# Patient Record
Sex: FEMALE | Race: WHITE | Employment: FULL TIME | URBAN - METROPOLITAN AREA
[De-identification: names, ages, dates, MRNs, and addresses within clinical notes are randomized per-mention and may not be internally consistent; named-entity substitution may affect disease eponyms.]

---

## 2017-12-29 ENCOUNTER — APPOINTMENT (OUTPATIENT)
Dept: GENERAL RADIOLOGY | Age: 47
End: 2017-12-29
Attending: PHYSICIAN ASSISTANT
Payer: COMMERCIAL

## 2017-12-29 ENCOUNTER — HOSPITAL ENCOUNTER (EMERGENCY)
Age: 47
Discharge: HOME | End: 2017-12-30
Attending: EMERGENCY MEDICINE
Payer: COMMERCIAL

## 2017-12-29 VITALS
OXYGEN SATURATION: 99 % | TEMPERATURE: 98 F | HEART RATE: 99 BPM | BODY MASS INDEX: 27.64 KG/M2 | RESPIRATION RATE: 18 BRPM | SYSTOLIC BLOOD PRESSURE: 136 MMHG | HEIGHT: 66 IN | WEIGHT: 171.96 LBS | DIASTOLIC BLOOD PRESSURE: 64 MMHG

## 2017-12-29 DIAGNOSIS — S52.571A OTHER CLOSED INTRA-ARTICULAR FRACTURE OF DISTAL END OF RIGHT RADIUS, INITIAL ENCOUNTER: Primary | ICD-10-CM

## 2017-12-29 PROCEDURE — 73100 X-RAY EXAM OF WRIST: CPT

## 2017-12-29 PROCEDURE — 99283 EMERGENCY DEPT VISIT LOW MDM: CPT

## 2017-12-29 PROCEDURE — 75810000301 HC ER LEVEL 1 CLOSED TREATMNT FRACTURE/DISLOCATION

## 2017-12-29 PROCEDURE — 73110 X-RAY EXAM OF WRIST: CPT

## 2017-12-29 PROCEDURE — 74011250637 HC RX REV CODE- 250/637: Performed by: PHYSICIAN ASSISTANT

## 2017-12-29 RX ORDER — LIDOCAINE HYDROCHLORIDE 10 MG/ML
10 INJECTION, SOLUTION EPIDURAL; INFILTRATION; INTRACAUDAL; PERINEURAL ONCE
Status: DISCONTINUED | OUTPATIENT
Start: 2017-12-29 | End: 2017-12-30 | Stop reason: HOSPADM

## 2017-12-29 RX ORDER — OXYCODONE AND ACETAMINOPHEN 5; 325 MG/1; MG/1
1 TABLET ORAL
Status: COMPLETED | OUTPATIENT
Start: 2017-12-29 | End: 2017-12-29

## 2017-12-29 RX ADMIN — OXYCODONE HYDROCHLORIDE AND ACETAMINOPHEN 1 TABLET: 5; 325 TABLET ORAL at 21:51

## 2017-12-30 PROCEDURE — A4565 SLINGS: HCPCS

## 2017-12-30 RX ORDER — NAPROXEN 500 MG/1
500 TABLET ORAL
Qty: 20 TAB | Refills: 0 | Status: SHIPPED | OUTPATIENT
Start: 2017-12-30

## 2017-12-30 RX ORDER — OXYCODONE AND ACETAMINOPHEN 5; 325 MG/1; MG/1
.5-1 TABLET ORAL
Qty: 16 TAB | Refills: 0 | Status: SHIPPED | OUTPATIENT
Start: 2017-12-30 | End: 2020-09-30

## 2017-12-30 NOTE — ED PROVIDER NOTES
EMERGENCY DEPARTMENT HISTORY AND PHYSICAL EXAM      Date: 2017  Patient Name: Leonid Mckee    History of Presenting Illness     Chief Complaint   Patient presents with   [facility] Fall     possible fx of right hand after falling off a hoverboard       History Provided By: Patient    HPI: Leonid Mckee, 52 y.o. female with no significant PMHx presents ambulatory to the ED with cc of 9/10 constant, and throbbing R wrist pain secondary to ground level fall PTA in ED. Pt describes drinking ETOH prior to stepping on her son's hover board, which then caused her to loose her balance, and falling backwards on her R hand. Pt denies any LOC, nausea, vomiting, and chance of being pregnant. Social Hx:   ETOH: +  Tobacco: +, .25ppd     PCP: None    There are no other complaints, changes, or physical findings at this time. Past History     Past Medical History:  Past Medical History:   Diagnosis Date    Infertility     fertility treatments with last pregnancy    Postpartum depression        Past Surgical History:  Past Surgical History:   Procedure Laterality Date     DELIVERY ONLY       with 1st pregnancy       Family History:  History reviewed. No pertinent family history. Social History:  Social History   Substance Use Topics    Smoking status: Current Every Day Smoker     Packs/day: 0.25    Smokeless tobacco: Never Used    Alcohol use Yes       Allergies:  No Known Allergies      Review of Systems   Review of Systems   Constitutional: Negative for chills, diaphoresis, fever and unexpected weight change. HENT: Negative for rhinorrhea and sore throat. Eyes: Negative for pain. Respiratory: Negative for shortness of breath, wheezing and stridor. Cardiovascular: Negative for chest pain and leg swelling. Gastrointestinal: Negative for abdominal pain, blood in stool, diarrhea, nausea and vomiting. Genitourinary: Negative for difficulty urinating, dysuria and flank pain. Musculoskeletal: Positive for arthralgias and myalgias. Negative for back pain and neck stiffness. Skin: Negative for rash. Neurological: Negative for seizures, syncope, weakness, light-headedness and headaches. -LOC   Psychiatric/Behavioral: Negative for confusion. Physical Exam   Physical Exam   Constitutional: She is oriented to person, place, and time. She appears well-developed and well-nourished. No distress. HENT:   Head: Normocephalic and atraumatic. Right Ear: External ear normal.   Left Ear: External ear normal.   Nose: Nose normal.   Mouth/Throat: Oropharynx is clear and moist. No oropharyngeal exudate. Eyes: Conjunctivae and EOM are normal. Pupils are equal, round, and reactive to light. Right eye exhibits no discharge. Left eye exhibits no discharge. No scleral icterus. Neck: Normal range of motion. Neck supple. No tracheal deviation present. Cardiovascular: Normal rate, regular rhythm, normal heart sounds and intact distal pulses. Exam reveals no gallop and no friction rub. No murmur heard. Pulmonary/Chest: Effort normal and breath sounds normal. No respiratory distress. She has no wheezes. She has no rales. She exhibits no tenderness. Abdominal: Soft. Bowel sounds are normal. She exhibits no distension and no mass. There is no tenderness. There is no rebound and no guarding. Musculoskeletal: She exhibits tenderness and deformity. She exhibits no edema. Posterior deformity to R wrist.  NVID on R hand. R wrist tenderness. Pt is able to move all of her fingers on R hand. Lymphadenopathy:     She has no cervical adenopathy. Neurological: She is alert and oriented to person, place, and time. No cranial nerve deficit. Skin: Skin is warm and dry. No rash noted. No erythema. Psychiatric: She has a normal mood and affect. Her behavior is normal.   Nursing note and vitals reviewed.         Diagnostic Study Results     Radiologic Studies -   XR WRIST RT AP/LAT/OBL MIN 3V   Final Result   INDICATION:   Fall, possible fracture     COMPARISON:  None     FINDINGS:  3 views of the right wrist demonstrate acute, comminuted and displaced fracture  involving the distal radius. There is overlap of fracture fragments, with the  distal fragments displaced dorsally relative to the remainder of the radial  shaft. Fracture extends into the articular surface. Diffuse soft tissue  swelling. There is no radiopaque foreign body.     IMPRESSION  IMPRESSION:  Acute, displaced distal radius fracture. Medical Decision Making   I am the first provider for this patient. I reviewed the vital signs, available nursing notes, past medical history, past surgical history, family history and social history. Vital Signs-Reviewed the patient's vital signs. Patient Vitals for the past 12 hrs:   Temp Pulse Resp BP SpO2   12/29/17 2156 98 °F (36.7 °C) 99 18 136/64 99 %     Provider Notes (Medical Decision Making):     DDx: sprain, strain, fracture. ED Course:   Initial assessment performed. The patients presenting problems have been discussed, and they are in agreement with the care plan formulated and outlined with them. I have encouraged them to ask questions as they arise throughout their visit. Consult Note:  10:21 PM  Waldemar Kay PA-C spoke with Adam Perkins NP, and Dr. Rodriguez  Specialty: Orthopedics  Discussed pt's hx, disposition, and available diagnostic and imaging results. Reviewed care plans. Consultant agrees with plans as outlined. I have contacted MARK Perkins via [facility] text. 10:27 PM  I reviewed the X-ray results. Written by Raul nicholas, as dictated by Waldemar Kay PA-C    10:33 PM  Per NP Rell Gonsalez's request, I have sent her the X-ray imaging results via [facility] text. Written by Raul nicholas, as dictated by Waldemar Kay PA-C    10:41 PM  MARK Gonsalez is going to come in, and reduce the fracture.    Written by Raul nicholas, as dictated by Garo Adair PA-C    10:59 PM  NP Arvind Mack is in the ED reviewing the X-ray imaging, and preparing to treat patient  Written by Javed Arrieta ED scribe, as dictated by Garo Adair PA-C      Disposition:  DISCHARGE NOTE  12:16 AM  The patient has been re-evaluated and is ready for discharge. Reviewed available results with patient. Counseled pt on diagnosis and care plan. Pt has expressed understanding, and all questions have been answered. Pt agrees with plan and agrees to F/U as recommended, or return to the ED if their sxs worsen. Discharge instructions have been provided and explained to the pt, along with reasons to return to the ED. PLAN:  1. Discharge Medication List as of 12/30/2017 12:09 AM      START taking these medications    Details   oxyCODONE-acetaminophen (PERCOCET) 5-325 mg per tablet Take 0.5-1 Tabs by mouth every six (6) hours as needed for Pain. Max Daily Amount: 4 Tabs., Print, Disp-16 Tab, R-0      naproxen (NAPROSYN) 500 mg tablet Take 1 Tab by mouth every twelve (12) hours as needed for Pain., Normal, Disp-20 Tab, R-0           2. Follow-up Information     Follow up With Details Comments Gen Gomez MD In 4 days wrist specialist -  Tuesday - call and make appointment  [address]  [phone]      [facility] EMERGENCY DEPT  If symptoms worsen [address] [place] [address]  [phone]        Return to ED if worse     Diagnosis     Clinical Impression:   1. Other closed intra-articular fracture of distal end of right radius, initial encounter        Attestations: This note is prepared by Javed Arrieta, acting as Scribe for [employer]. The scribe's documentation has been prepared under my direction and personally reviewed by me in its entirety. I confirm that the note above accurately reflects all work, treatment, procedures, and medical decision making performed by me.   Garo Adair PARDEEP

## 2017-12-30 NOTE — CONSULTS
ORTHOPAEDIC CONSULT NOTE    Subjective:     Date of Consultation:  2017      Nathaniel Domínguez is a 52 y.o. female who is being seen for R wrist pain s/p fall off her child's moore board. Pt is R hand dominant. + ETOH tonight. Work up has reveled a R distal radius fx. Patient Active Problem List    Diagnosis Date Noted    Active labor 2013    GBS (group B Streptococcus carrier), +RV culture, currently pregnant 2013     (vaginal birth after ) 2013    Hypothyroidism complicating pregnancy      History reviewed. No pertinent family history. Social History   Substance Use Topics    Smoking status: Current Every Day Smoker     Packs/day: 0.25    Smokeless tobacco: Never Used    Alcohol use Yes     Past Medical History:   Diagnosis Date    Infertility     fertility treatments with last pregnancy    Postpartum depression       Past Surgical History:   Procedure Laterality Date     DELIVERY ONLY       with 1st pregnancy      Prior to Admission medications    Not on File     Current Facility-Administered Medications   Medication Dose Route Frequency    lidocaine (PF) (XYLOCAINE) 10 mg/mL (1 %) injection 10 mL  10 mL SubCUTAneous ONCE     No current outpatient prescriptions on file. No Known Allergies     Review of Systems:  A comprehensive review of systems was negative except for that written in the HPI. Mental Status: no dementia    Objective:     Patient Vitals for the past 8 hrs:   BP Temp Pulse Resp SpO2 Height Weight   17 2156 136/64 98 °F (36.7 °C) 99 18 99 % 5' 6\" (1.676 m) 78 kg (171 lb 15.3 oz)     Temp (24hrs), Av °F (36.7 °C), Min:98 °F (36.7 °C), Max:98 °F (36.7 °C)      Gen: Well-developed,  in no acute distress   Musc: RUE - + edema with mild deformity, NVI    Skin: No skin breakdown noted.  Skin warm, pink, dry  Neuro: Cranial nerves are grossly intact, no focal motor weakness, follows commands appropriately Psych: Good insight, oriented to person, place and time, alert    Imaging Review:   FINDINGS:  3 views of the right wrist demonstrate acute, comminuted and displaced fracture  involving the distal radius. There is overlap of fracture fragments, with the  distal fragments displaced dorsally relative to the remainder of the radial  shaft. Fracture extends into the articular surface. Diffuse soft tissue  swelling. There is no radiopaque foreign body.     IMPRESSION  IMPRESSION:  Acute, displaced distal radius fracture.                Labs: No results found for this or any previous visit (from the past 24 hour(s)). Impression:     Patient Active Problem List    Diagnosis Date Noted    Active labor 2013    GBS (group B Streptococcus carrier), +RV culture, currently pregnant 2013     (vaginal birth after ) 2013    Hypothyroidism complicating pregnancy      Active Problems:    * No active hospital problems. *      Plan:   -  Pt is stable orthopaedically, Non-Operative management at this time  -  Reduction completed in the ED, see procedure note below   -  Ice and elevate, pain rx per ED, follow up with Dr. Chauncey Hashimoto within 1 week      PROCEDURE NOTE - FRACTURE REDUCTION: The patient was induced with a hematoma for procedrual sedation via the emergency department MD. A longitudinal traction in conjunction with re-creation of the injury maneuver was applied reducing the fracture. Subsequently, a sugar tong splint was applied and again reduction was confirmed with bedside imaging. The patient tolerated the procedure well. Post-reduction plain films reviewed with confirmation of a satisfactory reduction of the fracture. The extremity was neurovascularly intact post reduction and splint placement. Dr. Rodriguez aware and agrees with plan as above.         Mahendra Saleh, NP  Orthopedic Nurse Practitioner   [place] [facility]

## 2017-12-30 NOTE — DISCHARGE INSTRUCTIONS
Broken Wrist: Care Instructions  Your Care Instructions    Your wrist can break, or fracture, during sports, a fall, or other accidents. The break may happen when your wrist is hit or is used to protect you in a fall. Fractures can range from a small, hairline crack, to a bone or bones broken into two or more pieces. Your treatment depends on how bad the break is. Your doctor may have put your wrist in a cast or splint. This will help keep your wrist stable until your follow-up appointment. It may take weeks or months for your wrist to heal. You can help it heal with care at home. You heal best when you take good care of yourself. Eat a variety of healthy foods, and don't smoke. Follow-up care is a key part of your treatment and safety. Be sure to make and go to all appointments, and call your doctor if you are having problems. It's also a good idea to know your test results and keep a list of the medicines you take. How can you care for yourself at home? · Put ice or a cold pack on your wrist for 10 to 20 minutes at a time. Try to do this every 1 to 2 hours for the next 3 days (when you are awake). Put a thin cloth between the ice and your cast or splint. Keep your cast or splint dry. · Follow the splint or cast care instructions your doctor gives you. If you have a splint, do not take it off unless your doctor tells you to. Be careful not to put the splint on too tight. · Be safe with medicines. Take pain medicines exactly as directed. ¨ If the doctor gave you a prescription medicine for pain, take it as prescribed. ¨ If you are not taking a prescription pain medicine, ask your doctor if you can take an over-the-counter medicine. · Prop up your wrist on pillows when you sit or lie down in the first few days after the injury. Keep your wrist higher than the level of your heart. This will help reduce swelling.   · Move your fingers often to reduce swelling and stiffness, but do not use that hand to grab or carry anything. · Follow instructions for exercises to keep your arm strong. When should you call for help? Call your doctor now or seek immediate medical care if:  ? · You have new or worse pain. ? · Your hand or fingers are cool or pale or change color. ? · Your cast or splint feels too tight. ? · You have tingling, weakness, or numbness in your hand or fingers. ? Watch closely for changes in your health, and be sure to contact your doctor if:  ? · You do not get better as expected. ? · You have problems with your cast or splint. Where can you learn more? Go to http://gavin-ingrid.info/. Enter [id number] in the search box to learn more about \"Broken Wrist: Care Instructions. \"  Current as of: March 21, 2017  Content Version: 11.4  © 4791-4073 [employer]. Care instructions adapted under license by [employer] (which disclaims liability or warranty for this information). If you have questions about a medical condition or this instruction, always ask your healthcare professional. [address] any warranty or liability for your use of this information.

## 2017-12-30 NOTE — ED NOTES
Discharge instructions given to patient by SPENSER Marie. Verbalized understanding of instructions. Patient discharged without difficulty. Patient discharged in stable condition via wheelchair accompanied by staff.

## 2020-09-30 ENCOUNTER — APPOINTMENT (OUTPATIENT)
Dept: GENERAL RADIOLOGY | Age: 50
End: 2020-09-30
Attending: EMERGENCY MEDICINE
Payer: COMMERCIAL

## 2020-09-30 ENCOUNTER — APPOINTMENT (OUTPATIENT)
Dept: CT IMAGING | Age: 50
End: 2020-09-30
Attending: EMERGENCY MEDICINE
Payer: COMMERCIAL

## 2020-09-30 ENCOUNTER — APPOINTMENT (OUTPATIENT)
Dept: GENERAL RADIOLOGY | Age: 50
End: 2020-09-30
Attending: SURGERY
Payer: COMMERCIAL

## 2020-09-30 ENCOUNTER — APPOINTMENT (OUTPATIENT)
Dept: ULTRASOUND IMAGING | Age: 50
End: 2020-09-30
Attending: EMERGENCY MEDICINE
Payer: COMMERCIAL

## 2020-09-30 ENCOUNTER — ANESTHESIA EVENT (OUTPATIENT)
Dept: SURGERY | Age: 50
End: 2020-09-30
Payer: COMMERCIAL

## 2020-09-30 ENCOUNTER — HOSPITAL ENCOUNTER (EMERGENCY)
Age: 50
Discharge: HOME | End: 2020-09-30
Attending: EMERGENCY MEDICINE
Payer: COMMERCIAL

## 2020-09-30 ENCOUNTER — ANESTHESIA (OUTPATIENT)
Dept: SURGERY | Age: 50
End: 2020-09-30
Payer: COMMERCIAL

## 2020-09-30 VITALS
OXYGEN SATURATION: 96 % | DIASTOLIC BLOOD PRESSURE: 48 MMHG | RESPIRATION RATE: 16 BRPM | TEMPERATURE: 98 F | WEIGHT: 160 LBS | BODY MASS INDEX: 25.71 KG/M2 | HEIGHT: 66 IN | SYSTOLIC BLOOD PRESSURE: 103 MMHG | HEART RATE: 70 BPM

## 2020-09-30 DIAGNOSIS — K81.0 ACUTE CHOLECYSTITIS: Primary | ICD-10-CM

## 2020-09-30 DIAGNOSIS — R06.02 SOB (SHORTNESS OF BREATH): ICD-10-CM

## 2020-09-30 DIAGNOSIS — R11.2 NON-INTRACTABLE VOMITING WITH NAUSEA, UNSPECIFIED VOMITING TYPE: ICD-10-CM

## 2020-09-30 DIAGNOSIS — R07.89 ATYPICAL CHEST PAIN: ICD-10-CM

## 2020-09-30 LAB
ALBUMIN SERPL-MCNC: 3.8 G/DL (ref 3.5–5)
ALBUMIN/GLOB SERPL: 1.1 {RATIO} (ref 1.1–2.2)
ALP SERPL-CCNC: 65 U/L (ref 45–117)
ALT SERPL-CCNC: 26 U/L (ref 12–78)
AMPHET UR QL SCN: NEGATIVE
ANION GAP SERPL CALC-SCNC: 7 MMOL/L (ref 5–15)
APPEARANCE UR: CLEAR
AST SERPL-CCNC: 14 U/L (ref 15–37)
ATRIAL RATE: 60 BPM
BACTERIA URNS QL MICRO: NEGATIVE /HPF
BARBITURATES UR QL SCN: NEGATIVE
BASOPHILS # BLD: 0.1 K/UL (ref 0–0.1)
BASOPHILS NFR BLD: 0 % (ref 0–1)
BENZODIAZ UR QL: NEGATIVE
BILIRUB SERPL-MCNC: 0.3 MG/DL (ref 0.2–1)
BILIRUB UR QL: NEGATIVE
BNP SERPL-MCNC: 54 PG/ML
BUN SERPL-MCNC: 23 MG/DL (ref 6–20)
BUN/CREAT SERPL: 22 (ref 12–20)
CALCIUM SERPL-MCNC: 8.9 MG/DL (ref 8.5–10.1)
CALCULATED P AXIS, ECG09: 76 DEGREES
CALCULATED R AXIS, ECG10: 57 DEGREES
CALCULATED T AXIS, ECG11: 51 DEGREES
CANNABINOIDS UR QL SCN: NEGATIVE
CHLORIDE SERPL-SCNC: 107 MMOL/L (ref 97–108)
CO2 SERPL-SCNC: 24 MMOL/L (ref 21–32)
COCAINE UR QL SCN: NEGATIVE
COLOR UR: NORMAL
CREAT SERPL-MCNC: 1.06 MG/DL (ref 0.55–1.02)
D DIMER PPP FEU-MCNC: 0.21 MG/L FEU (ref 0–0.65)
DIAGNOSIS, 93000: NORMAL
DIFFERENTIAL METHOD BLD: ABNORMAL
DRUG SCRN COMMENT,DRGCM: NORMAL
EOSINOPHIL # BLD: 0.1 K/UL (ref 0–0.4)
EOSINOPHIL NFR BLD: 1 % (ref 0–7)
EPITH CASTS URNS QL MICRO: NORMAL /LPF
ERYTHROCYTE [DISTWIDTH] IN BLOOD BY AUTOMATED COUNT: 12.6 % (ref 11.5–14.5)
GLOBULIN SER CALC-MCNC: 3.5 G/DL (ref 2–4)
GLUCOSE SERPL-MCNC: 136 MG/DL (ref 65–100)
GLUCOSE UR STRIP.AUTO-MCNC: NEGATIVE MG/DL
HCG SERPL QL: NEGATIVE
HCG UR QL: NEGATIVE
HCT VFR BLD AUTO: 40.7 % (ref 35–47)
HGB BLD-MCNC: 13.9 G/DL (ref 11.5–16)
HGB UR QL STRIP: NEGATIVE
HYALINE CASTS URNS QL MICRO: NORMAL /LPF (ref 0–5)
IMM GRANULOCYTES # BLD AUTO: 0.1 K/UL (ref 0–0.04)
IMM GRANULOCYTES NFR BLD AUTO: 1 % (ref 0–0.5)
KETONES UR QL STRIP.AUTO: NEGATIVE MG/DL
LEUKOCYTE ESTERASE UR QL STRIP.AUTO: NEGATIVE
LIPASE SERPL-CCNC: 94 U/L (ref 73–393)
LYMPHOCYTES # BLD: 2.4 K/UL (ref 0.8–3.5)
LYMPHOCYTES NFR BLD: 14 % (ref 12–49)
MCH RBC QN AUTO: 31 PG (ref 26–34)
MCHC RBC AUTO-ENTMCNC: 34.2 G/DL (ref 30–36.5)
MCV RBC AUTO: 90.8 FL (ref 80–99)
METHADONE UR QL: NEGATIVE
MONOCYTES # BLD: 0.8 K/UL (ref 0–1)
MONOCYTES NFR BLD: 5 % (ref 5–13)
NEUTS SEG # BLD: 14 K/UL (ref 1.8–8)
NEUTS SEG NFR BLD: 79 % (ref 32–75)
NITRITE UR QL STRIP.AUTO: NEGATIVE
NRBC # BLD: 0 K/UL (ref 0–0.01)
NRBC BLD-RTO: 0 PER 100 WBC
OPIATES UR QL: NEGATIVE
P-R INTERVAL, ECG05: 152 MS
PCP UR QL: NEGATIVE
PH UR STRIP: 7.5 [PH] (ref 5–8)
PLATELET # BLD AUTO: 314 K/UL (ref 150–400)
PMV BLD AUTO: 11.5 FL (ref 8.9–12.9)
POTASSIUM SERPL-SCNC: 3.6 MMOL/L (ref 3.5–5.1)
PROT SERPL-MCNC: 7.3 G/DL (ref 6.4–8.2)
PROT UR STRIP-MCNC: NEGATIVE MG/DL
Q-T INTERVAL, ECG07: 448 MS
QRS DURATION, ECG06: 86 MS
QTC CALCULATION (BEZET), ECG08: 448 MS
RBC # BLD AUTO: 4.48 M/UL (ref 3.8–5.2)
RBC #/AREA URNS HPF: NORMAL /HPF (ref 0–5)
SODIUM SERPL-SCNC: 138 MMOL/L (ref 136–145)
SP GR UR REFRACTOMETRY: 1.01 (ref 1–1.03)
TROPONIN I SERPL-MCNC: <0.05 NG/ML
UA: UC IF INDICATED,UAUC: NORMAL
UROBILINOGEN UR QL STRIP.AUTO: 0.2 EU/DL (ref 0.2–1)
VENTRICULAR RATE, ECG03: 60 BPM
WBC # BLD AUTO: 17.6 K/UL (ref 3.6–11)
WBC URNS QL MICRO: NORMAL /HPF (ref 0–4)

## 2020-09-30 PROCEDURE — 96374 THER/PROPH/DIAG INJ IV PUSH: CPT

## 2020-09-30 PROCEDURE — 51701 INSERT BLADDER CATHETER: CPT

## 2020-09-30 PROCEDURE — 83690 ASSAY OF LIPASE: CPT

## 2020-09-30 PROCEDURE — 74011250636 HC RX REV CODE- 250/636: Performed by: NURSE ANESTHETIST, CERTIFIED REGISTERED

## 2020-09-30 PROCEDURE — 76210000000 HC OR PH I REC 2 TO 2.5 HR: Performed by: SURGERY

## 2020-09-30 PROCEDURE — 76060000033 HC ANESTHESIA 1 TO 1.5 HR: Performed by: SURGERY

## 2020-09-30 PROCEDURE — 77030009403 HC ELECTRD ENDO MEGA -B: Performed by: SURGERY

## 2020-09-30 PROCEDURE — 80053 COMPREHEN METABOLIC PANEL: CPT

## 2020-09-30 PROCEDURE — 76705 ECHO EXAM OF ABDOMEN: CPT

## 2020-09-30 PROCEDURE — 96361 HYDRATE IV INFUSION ADD-ON: CPT

## 2020-09-30 PROCEDURE — 99282 EMERGENCY DEPT VISIT SF MDM: CPT | Performed by: SURGERY

## 2020-09-30 PROCEDURE — 74011000250 HC RX REV CODE- 250: Performed by: NURSE ANESTHETIST, CERTIFIED REGISTERED

## 2020-09-30 PROCEDURE — 74011000636 HC RX REV CODE- 636: Performed by: EMERGENCY MEDICINE

## 2020-09-30 PROCEDURE — 77030010507 HC ADH SKN DERMBND J&J -B: Performed by: SURGERY

## 2020-09-30 PROCEDURE — 47563 LAPARO CHOLECYSTECTOMY/GRAPH: CPT | Performed by: SURGERY

## 2020-09-30 PROCEDURE — 77030007955 HC PCH ENDOSC SPEC J&J -B: Performed by: SURGERY

## 2020-09-30 PROCEDURE — 74011000258 HC RX REV CODE- 258: Performed by: NURSE ANESTHETIST, CERTIFIED REGISTERED

## 2020-09-30 PROCEDURE — 74011000636 HC RX REV CODE- 636: Performed by: SURGERY

## 2020-09-30 PROCEDURE — 77030008756 HC TU IRR SUC STRY -B: Performed by: SURGERY

## 2020-09-30 PROCEDURE — 84703 CHORIONIC GONADOTROPIN ASSAY: CPT

## 2020-09-30 PROCEDURE — 77030018836 HC SOL IRR NACL ICUM -A: Performed by: SURGERY

## 2020-09-30 PROCEDURE — 77030026438 HC STYL ET INTUB CARD -A: Performed by: NURSE ANESTHETIST, CERTIFIED REGISTERED

## 2020-09-30 PROCEDURE — 88304 TISSUE EXAM BY PATHOLOGIST: CPT

## 2020-09-30 PROCEDURE — 74300 X-RAY BILE DUCTS/PANCREAS: CPT

## 2020-09-30 PROCEDURE — 93005 ELECTROCARDIOGRAM TRACING: CPT

## 2020-09-30 PROCEDURE — 77030020829: Performed by: SURGERY

## 2020-09-30 PROCEDURE — 36415 COLL VENOUS BLD VENIPUNCTURE: CPT

## 2020-09-30 PROCEDURE — 77030010513 HC APPL CLP LIG J&J -C: Performed by: SURGERY

## 2020-09-30 PROCEDURE — 77030031139 HC SUT VCRL2 J&J -A: Performed by: SURGERY

## 2020-09-30 PROCEDURE — 77030008684 HC TU ET CUF COVD -B: Performed by: NURSE ANESTHETIST, CERTIFIED REGISTERED

## 2020-09-30 PROCEDURE — 80307 DRUG TEST PRSMV CHEM ANLYZR: CPT

## 2020-09-30 PROCEDURE — 99285 EMERGENCY DEPT VISIT HI MDM: CPT

## 2020-09-30 PROCEDURE — 81025 URINE PREGNANCY TEST: CPT

## 2020-09-30 PROCEDURE — 74011250636 HC RX REV CODE- 250/636: Performed by: ANESTHESIOLOGY

## 2020-09-30 PROCEDURE — 76010000149 HC OR TIME 1 TO 1.5 HR: Performed by: SURGERY

## 2020-09-30 PROCEDURE — 71045 X-RAY EXAM CHEST 1 VIEW: CPT

## 2020-09-30 PROCEDURE — 74011000250 HC RX REV CODE- 250: Performed by: SURGERY

## 2020-09-30 PROCEDURE — 77030002895 HC DEV VASC CLOSR COVD -B: Performed by: SURGERY

## 2020-09-30 PROCEDURE — 77030040361 HC SLV COMPR DVT MDII -B: Performed by: SURGERY

## 2020-09-30 PROCEDURE — 81001 URINALYSIS AUTO W/SCOPE: CPT

## 2020-09-30 PROCEDURE — 77030004813 HC CATH CHOLGM TELE -B: Performed by: SURGERY

## 2020-09-30 PROCEDURE — 2709999900 HC NON-CHARGEABLE SUPPLY: Performed by: SURGERY

## 2020-09-30 PROCEDURE — 77030002933 HC SUT MCRYL J&J -A: Performed by: SURGERY

## 2020-09-30 PROCEDURE — 77030012770 HC TRCR OPT FX AMR -B: Performed by: SURGERY

## 2020-09-30 PROCEDURE — 85379 FIBRIN DEGRADATION QUANT: CPT

## 2020-09-30 PROCEDURE — 74011250636 HC RX REV CODE- 250/636: Performed by: EMERGENCY MEDICINE

## 2020-09-30 PROCEDURE — 84484 ASSAY OF TROPONIN QUANT: CPT

## 2020-09-30 PROCEDURE — 74177 CT ABD & PELVIS W/CONTRAST: CPT

## 2020-09-30 PROCEDURE — 83880 ASSAY OF NATRIURETIC PEPTIDE: CPT

## 2020-09-30 PROCEDURE — 74011250637 HC RX REV CODE- 250/637: Performed by: SURGERY

## 2020-09-30 PROCEDURE — 85025 COMPLETE CBC W/AUTO DIFF WBC: CPT

## 2020-09-30 PROCEDURE — 77030008606 HC TRCR ENDOSC KII AMR -B: Performed by: SURGERY

## 2020-09-30 PROCEDURE — 77030020263 HC SOL INJ SOD CL0.9% LFCR 1000ML: Performed by: SURGERY

## 2020-09-30 RX ORDER — SODIUM CHLORIDE 0.9 % (FLUSH) 0.9 %
5-40 SYRINGE (ML) INJECTION EVERY 8 HOURS
Status: DISCONTINUED | OUTPATIENT
Start: 2020-09-30 | End: 2020-09-30 | Stop reason: HOSPADM

## 2020-09-30 RX ORDER — OXYCODONE AND ACETAMINOPHEN 5; 325 MG/1; MG/1
1 TABLET ORAL
Qty: 12 TAB | Refills: 0 | Status: SHIPPED | OUTPATIENT
Start: 2020-09-30 | End: 2020-10-03

## 2020-09-30 RX ORDER — KETOROLAC TROMETHAMINE 30 MG/ML
INJECTION, SOLUTION INTRAMUSCULAR; INTRAVENOUS AS NEEDED
Status: DISCONTINUED | OUTPATIENT
Start: 2020-09-30 | End: 2020-09-30 | Stop reason: HOSPADM

## 2020-09-30 RX ORDER — SODIUM CHLORIDE 0.9 % (FLUSH) 0.9 %
5-40 SYRINGE (ML) INJECTION AS NEEDED
Status: DISCONTINUED | OUTPATIENT
Start: 2020-09-30 | End: 2020-09-30 | Stop reason: HOSPADM

## 2020-09-30 RX ORDER — PROPOFOL 10 MG/ML
INJECTION, EMULSION INTRAVENOUS AS NEEDED
Status: DISCONTINUED | OUTPATIENT
Start: 2020-09-30 | End: 2020-09-30 | Stop reason: HOSPADM

## 2020-09-30 RX ORDER — HYDROMORPHONE HYDROCHLORIDE 1 MG/ML
0.2 INJECTION, SOLUTION INTRAMUSCULAR; INTRAVENOUS; SUBCUTANEOUS
Status: DISCONTINUED | OUTPATIENT
Start: 2020-09-30 | End: 2020-09-30 | Stop reason: HOSPADM

## 2020-09-30 RX ORDER — SODIUM CHLORIDE 0.9 % (FLUSH) 0.9 %
10 SYRINGE (ML) INJECTION
Status: COMPLETED | OUTPATIENT
Start: 2020-09-30 | End: 2020-09-30

## 2020-09-30 RX ORDER — DIPHENHYDRAMINE HYDROCHLORIDE 50 MG/ML
12.5 INJECTION, SOLUTION INTRAMUSCULAR; INTRAVENOUS AS NEEDED
Status: DISCONTINUED | OUTPATIENT
Start: 2020-09-30 | End: 2020-09-30 | Stop reason: HOSPADM

## 2020-09-30 RX ORDER — ONDANSETRON 2 MG/ML
4 INJECTION INTRAMUSCULAR; INTRAVENOUS
Status: DISCONTINUED | OUTPATIENT
Start: 2020-09-30 | End: 2020-09-30 | Stop reason: HOSPADM

## 2020-09-30 RX ORDER — NEOSTIGMINE METHYLSULFATE 1 MG/ML
INJECTION, SOLUTION INTRAVENOUS AS NEEDED
Status: DISCONTINUED | OUTPATIENT
Start: 2020-09-30 | End: 2020-09-30 | Stop reason: HOSPADM

## 2020-09-30 RX ORDER — ROCURONIUM BROMIDE 10 MG/ML
INJECTION, SOLUTION INTRAVENOUS AS NEEDED
Status: DISCONTINUED | OUTPATIENT
Start: 2020-09-30 | End: 2020-09-30 | Stop reason: HOSPADM

## 2020-09-30 RX ORDER — DICYCLOMINE HYDROCHLORIDE 10 MG/1
10 CAPSULE ORAL 4 TIMES DAILY
Qty: 20 CAP | Refills: 0 | Status: SHIPPED | OUTPATIENT
Start: 2020-09-30 | End: 2020-10-05

## 2020-09-30 RX ORDER — ONDANSETRON 4 MG/1
4 TABLET, ORALLY DISINTEGRATING ORAL
Qty: 10 TAB | Refills: 0 | Status: SHIPPED | OUTPATIENT
Start: 2020-09-30

## 2020-09-30 RX ORDER — LIDOCAINE HYDROCHLORIDE 20 MG/ML
INJECTION, SOLUTION EPIDURAL; INFILTRATION; INTRACAUDAL; PERINEURAL AS NEEDED
Status: DISCONTINUED | OUTPATIENT
Start: 2020-09-30 | End: 2020-09-30 | Stop reason: HOSPADM

## 2020-09-30 RX ORDER — LIDOCAINE HYDROCHLORIDE 10 MG/ML
0.1 INJECTION, SOLUTION EPIDURAL; INFILTRATION; INTRACAUDAL; PERINEURAL AS NEEDED
Status: DISCONTINUED | OUTPATIENT
Start: 2020-09-30 | End: 2020-09-30 | Stop reason: HOSPADM

## 2020-09-30 RX ORDER — SERTRALINE HYDROCHLORIDE 50 MG/1
TABLET, FILM COATED ORAL
COMMUNITY

## 2020-09-30 RX ORDER — FENTANYL CITRATE 50 UG/ML
25 INJECTION, SOLUTION INTRAMUSCULAR; INTRAVENOUS
Status: DISCONTINUED | OUTPATIENT
Start: 2020-09-30 | End: 2020-09-30 | Stop reason: HOSPADM

## 2020-09-30 RX ORDER — SUCCINYLCHOLINE CHLORIDE 20 MG/ML
INJECTION INTRAMUSCULAR; INTRAVENOUS AS NEEDED
Status: DISCONTINUED | OUTPATIENT
Start: 2020-09-30 | End: 2020-09-30 | Stop reason: HOSPADM

## 2020-09-30 RX ORDER — BUPIVACAINE HYDROCHLORIDE AND EPINEPHRINE 5; 5 MG/ML; UG/ML
30 INJECTION, SOLUTION EPIDURAL; INTRACAUDAL; PERINEURAL ONCE
Status: COMPLETED | OUTPATIENT
Start: 2020-09-30 | End: 2020-09-30

## 2020-09-30 RX ORDER — CEFAZOLIN SODIUM 1 G/3ML
INJECTION, POWDER, FOR SOLUTION INTRAMUSCULAR; INTRAVENOUS AS NEEDED
Status: DISCONTINUED | OUTPATIENT
Start: 2020-09-30 | End: 2020-09-30 | Stop reason: HOSPADM

## 2020-09-30 RX ORDER — MIDAZOLAM HYDROCHLORIDE 1 MG/ML
INJECTION, SOLUTION INTRAMUSCULAR; INTRAVENOUS AS NEEDED
Status: DISCONTINUED | OUTPATIENT
Start: 2020-09-30 | End: 2020-09-30 | Stop reason: HOSPADM

## 2020-09-30 RX ORDER — SODIUM CHLORIDE, SODIUM LACTATE, POTASSIUM CHLORIDE, CALCIUM CHLORIDE 600; 310; 30; 20 MG/100ML; MG/100ML; MG/100ML; MG/100ML
25 INJECTION, SOLUTION INTRAVENOUS CONTINUOUS
Status: DISCONTINUED | OUTPATIENT
Start: 2020-09-30 | End: 2020-09-30 | Stop reason: HOSPADM

## 2020-09-30 RX ORDER — KETOROLAC TROMETHAMINE 30 MG/ML
15 INJECTION, SOLUTION INTRAMUSCULAR; INTRAVENOUS
Status: COMPLETED | OUTPATIENT
Start: 2020-09-30 | End: 2020-09-30

## 2020-09-30 RX ORDER — FENTANYL CITRATE 50 UG/ML
INJECTION, SOLUTION INTRAMUSCULAR; INTRAVENOUS AS NEEDED
Status: DISCONTINUED | OUTPATIENT
Start: 2020-09-30 | End: 2020-09-30 | Stop reason: HOSPADM

## 2020-09-30 RX ORDER — DEXAMETHASONE SODIUM PHOSPHATE 4 MG/ML
INJECTION, SOLUTION INTRA-ARTICULAR; INTRALESIONAL; INTRAMUSCULAR; INTRAVENOUS; SOFT TISSUE AS NEEDED
Status: DISCONTINUED | OUTPATIENT
Start: 2020-09-30 | End: 2020-09-30 | Stop reason: HOSPADM

## 2020-09-30 RX ORDER — ONDANSETRON 2 MG/ML
INJECTION INTRAMUSCULAR; INTRAVENOUS AS NEEDED
Status: DISCONTINUED | OUTPATIENT
Start: 2020-09-30 | End: 2020-09-30 | Stop reason: HOSPADM

## 2020-09-30 RX ORDER — GLYCOPYRROLATE 0.2 MG/ML
INJECTION INTRAMUSCULAR; INTRAVENOUS AS NEEDED
Status: DISCONTINUED | OUTPATIENT
Start: 2020-09-30 | End: 2020-09-30 | Stop reason: HOSPADM

## 2020-09-30 RX ADMIN — SUCCINYLCHOLINE CHLORIDE 100 MG: 20 INJECTION, SOLUTION INTRAMUSCULAR; INTRAVENOUS at 12:44

## 2020-09-30 RX ADMIN — GLYCOPYRROLATE 0.2 MG: 0.2 INJECTION, SOLUTION INTRAMUSCULAR; INTRAVENOUS at 13:41

## 2020-09-30 RX ADMIN — MIDAZOLAM HYDROCHLORIDE 2 MG: 1 INJECTION, SOLUTION INTRAMUSCULAR; INTRAVENOUS at 12:33

## 2020-09-30 RX ADMIN — FENTANYL CITRATE 50 MCG: 50 INJECTION, SOLUTION INTRAMUSCULAR; INTRAVENOUS at 12:44

## 2020-09-30 RX ADMIN — ROCURONIUM BROMIDE 30 MG: 10 INJECTION INTRAVENOUS at 12:52

## 2020-09-30 RX ADMIN — SODIUM CHLORIDE 1000 ML: 900 INJECTION, SOLUTION INTRAVENOUS at 05:00

## 2020-09-30 RX ADMIN — KETOROLAC TROMETHAMINE 15 MG: 30 INJECTION, SOLUTION INTRAMUSCULAR; INTRAVENOUS at 13:37

## 2020-09-30 RX ADMIN — FENTANYL CITRATE 50 MCG: 50 INJECTION, SOLUTION INTRAMUSCULAR; INTRAVENOUS at 13:03

## 2020-09-30 RX ADMIN — DEXMEDETOMIDINE HYDROCHLORIDE 4 MCG: 100 INJECTION, SOLUTION, CONCENTRATE INTRAVENOUS at 13:03

## 2020-09-30 RX ADMIN — DEXMEDETOMIDINE HYDROCHLORIDE 4 MCG: 100 INJECTION, SOLUTION, CONCENTRATE INTRAVENOUS at 13:38

## 2020-09-30 RX ADMIN — DEXAMETHASONE SODIUM PHOSPHATE 8 MG: 4 INJECTION, SOLUTION INTRAMUSCULAR; INTRAVENOUS at 12:51

## 2020-09-30 RX ADMIN — IOPAMIDOL 100 ML: 755 INJECTION, SOLUTION INTRAVENOUS at 06:43

## 2020-09-30 RX ADMIN — Medication 3 AMPULE: at 12:00

## 2020-09-30 RX ADMIN — GLYCOPYRROLATE 0.4 MG: 0.2 INJECTION, SOLUTION INTRAMUSCULAR; INTRAVENOUS at 13:37

## 2020-09-30 RX ADMIN — DEXMEDETOMIDINE HYDROCHLORIDE 4 MCG: 100 INJECTION, SOLUTION, CONCENTRATE INTRAVENOUS at 13:30

## 2020-09-30 RX ADMIN — CEFAZOLIN 2000 MG: 1 INJECTION, POWDER, FOR SOLUTION INTRAMUSCULAR; INTRAVENOUS; PARENTERAL at 12:56

## 2020-09-30 RX ADMIN — SODIUM CHLORIDE, POTASSIUM CHLORIDE, SODIUM LACTATE AND CALCIUM CHLORIDE: 600; 310; 30; 20 INJECTION, SOLUTION INTRAVENOUS at 12:40

## 2020-09-30 RX ADMIN — ONDANSETRON HYDROCHLORIDE 4 MG: 2 INJECTION, SOLUTION INTRAMUSCULAR; INTRAVENOUS at 12:51

## 2020-09-30 RX ADMIN — FENTANYL CITRATE 25 MCG: 50 INJECTION, SOLUTION INTRAMUSCULAR; INTRAVENOUS at 15:04

## 2020-09-30 RX ADMIN — KETOROLAC TROMETHAMINE 15 MG: 30 INJECTION, SOLUTION INTRAMUSCULAR at 05:33

## 2020-09-30 RX ADMIN — LIDOCAINE HYDROCHLORIDE 80 MG: 20 INJECTION, SOLUTION EPIDURAL; INFILTRATION; INTRACAUDAL; PERINEURAL at 12:44

## 2020-09-30 RX ADMIN — Medication 10 ML: at 06:43

## 2020-09-30 RX ADMIN — FENTANYL CITRATE 25 MCG: 50 INJECTION, SOLUTION INTRAMUSCULAR; INTRAVENOUS at 15:25

## 2020-09-30 RX ADMIN — Medication 3 MG: at 13:37

## 2020-09-30 RX ADMIN — PROPOFOL 120 MG: 10 INJECTION, EMULSION INTRAVENOUS at 12:44

## 2020-09-30 NOTE — OP NOTES
Laparoscopic Cholecystectomy with IOC Procedure Note    Patient: Jose Calloway  YOB: 1970  MRN: [id number]    Date of Procedure: 9/30/2020       Proceedure: laparoscopic cholecystectomy with Cholangiogram    Pre-operative Diagnosis: Acute cholecystitis    Post-operative Diagnosis:  Acute cholecystitis    Surgeon: Surgeon(s):  Mary Jane Sanchez MD    Assistant: Dion White NP    Anesthesia: General     Indications: This patient presents with a symptomatic gallbladder disease and will undergo laparoscopic cholecystecomy. Procedure Details   The patient was seen again in the Holding Room. The risks, benefits, complications, treatment options, and expected outcomes were discussed with the patient. The possibilities of reaction to medication, pulmonary aspiration, perforation of viscus, bleeding, recurrent infection, finding a normal gallbladder, the need for additional procedures, failure to diagnose a condition, the possible need to convert to an open procedure, and creating a complication requiring transfusion or operation were discussed with the patient. The patient and/or family concurred with the proposed plan, giving informed consent. The patient was taken to Operating Room, identified as Jose Calloway and the procedure verified as Laparoscopic Cholecystectomy with possible Intraoperative Cholangiograms. A Time Out was held and the above information confirmed. Prior to the induction of general anesthesia,  antibiotic prophylaxis was administered. General endotracheal anesthesia was then administered and tolerated well. After the induction, the abdomen was prepped in the usual sterile fashion. The abdomen was entered in the right upper quadrant in the midclavicular line just below the costal margin. At this site, 3 mL of 0.5% Marcaine was injected in the subcutaneous space. A 5-mm incision made with an 11-blade scalpel.  Then a 5-mm Xcel trocar containing 5-mm 0-degree laparoscope was used to dissect through the layers of the abdominal wall under direct laparoscopic vision. Entry into the abdomen was confirmed visually. Once within the abdomen, insufflation was provided through this trocar to a pressure of 15 mmHg. The patient tolerated insufflation well and there were no apparent complications. A 360-degree evaluation of the abdomen was then performed from this trocar  site. There were no peritoneal implants identified. There were no abnormalities on the surface of the liver. Attention was then focused at the umbilicus. Marcaine plain 0.5% 3 mL was injected in the subcutaneous space, as well as the preperitoneal space. A 12-mm curvilinear incision was made at the base of the umbilicus, and then a 57-SV Xcel trocar was inserted under direct laparoscopic vision into the abdominal cavity. The camera was then switched to this trocar position. The patient was then placed in reverse Trendelenburg with right side up. Attention was focused at the right upper quadrant, and 2 additional trocars were placed. One 5-mm trocar was placed in the epigastrium just below the xyphyoid The third 5-mm trocar was placed in the anterior axillary line just below the costal margin. At both sites, 3 mL of 0.5% Marcaine was injected into the skin and the preperitoneal space, a 5-mm incision made, and then the 5-mm trocar inserted under direct laparoscopic vision. The fundus of the gallbladder was then grasped and retracted over the dome of the liver. The infundibulum was grasped and retracted to the right and inferiorly. Blunt dissection was used to dissect out the space between the infundibulum and the gallbladder bed, and then blunt dissection was used to dissect out the cystic duct and  cystic artery. A critical view was obtained where these were the only 2 structures entering the gallbladder. Once this critical view was obtained, a clip was placed at the base of the infundibulum.  A choledochotomy was made in the very distal cystic duct. Then a 14-gauge angiocatheter was used to introduce an Arrow cholangiogram catheter into the abdominal cavity. The catheter was irrigated with saline and then inserted into the choledochotomy. Once within the cystic duct, the balloon was inflated and the cystic duct irrigated with normal saline. There was no evidence of any leakage out through the choledochotomy, indicating good seal with the balloon. Of note, the saline irrigated easily without much resistance. All instruments were removed from the patient's abdomen. She was placed in a supine position and a C-arm was brought into the field. A  film was shot to obtain proper orientation and alignment of the C-arm, and then, under real-time fluoroscopy, contrast was injected slowly by hand. Contrast was seen flowing through the cystic duct and into a common bile duct. There was no filling defect seen in the common bile duct and the duct was not dilated. Good  flow into the  duodenum was visualized. Contrast easily refluxed up the common hepatic  duct and into right and left hepatic ducts and into secondary and tertiary  biliary radicles within the liver without signs of filling defects. The C-arm was removed, the cholangiogram catheter removed. Two clips were  placed on the proximal cystic duct and then the choledochotomy  completed with laparoscopic scissors. The artery was clipped and divided in a similar fashion. The infundibulum of the gallbladder was then retracted towards the anterior abdominal wall and the gallbladder removed from the gallbladder fossa with electrocautery. The gallbladder was then placed over the right lobe of the liver. An Endocatch bag was then inserted through the umbilical trochar. The gallbladder was then placed in the EndoCatch bag and removed through the umbilical trocar site. The gallbladder was then sent to Pathology. Next, the right upper quadrant was inspected.  The gallbladder fossa appeared dry. There was no evidence of any bleeding. The cystic duct remnant had 2 clips across it, and an Endoloop, with no evidence of any leakage of bile. The cystic artery remnant was inspected. It had 2 clips across it with no evidence of any bleeding. The right upper quadrant was then irrigated with 300 mL of normal saline. The irrigation came back  clear. Attention was then focused on the umbilical trocar site, and the fascia at  this umbilical trocar site was closed with a transfascial sutures of 0  Vicryl. This was done using an Endo Close device under direct laparoscopic  vision. Once this suture was tied, there was no loss of pneumoperitoneum through the umbilical trocar site and no palpable defect, indicating adequate closure of the trocar site. Pneumoinsufflation was then vented through the 5-mm trocar sites. The trocars were then removed and the skin at all trocar sites closed with 4-0 Monocryl and Dermabond. The patient was extubated in the room and taken to the [address] [place] [address]  Unit in stable condition. Instrument, sponge, and needle counts were correct x2. Findings:  Cholecystitis with Cholelithiasis  Normal cholangiogram    Estimated Blood Loss:  Minimal           Drains:  * No LDAs found *           Specimens: Gallbladder             Complications:  None; patient tolerated the procedure well. Implants: * No implants in log *    Disposition: PACU - hemodynamically stable.            Condition: stable      Electronically Signed by Leona Marrero MD on 9/30/2020 at 1:56 PM

## 2020-09-30 NOTE — ED NOTES
TRANSFER - OUT REPORT:    Verbal report given to Noa Singh RN (name) on Jose Calloway  being transferred to Pre-OP (unit) for ordered procedure       Report consisted of patients Situation, Background, Assessment and   Recommendations(SBAR). Information from the following report(s) SBAR, Kardex and ED Summary was reviewed with the receiving nurse. Lines:   Peripheral IV 09/30/20 Right Antecubital (Active)   Site Assessment Clean, dry, & intact 09/30/20 0416   Phlebitis Assessment 0 09/30/20 0416   Infiltration Assessment 0 09/30/20 0416   Dressing Status Clean, dry, & intact 09/30/20 0416        Opportunity for questions and clarification was provided. Will perform pre-op checklist, and CHG bath in ER.

## 2020-09-30 NOTE — ED PROVIDER NOTES
EMERGENCY DEPARTMENT HISTORY AND PHYSICAL EXAM     ----------------------------------------------------------------------------  Please note that this dictation was completed with [employer], the [employer] voice recognition software. Quite often unanticipated grammatical, syntax, homophones, and other interpretive errors are inadvertently transcribed by the computer software. Please disregard these errors. Please excuse any errors that have escaped final proofreading  ----------------------------------------------------------------------------      Date: 9/30/2020  Patient Name: Audrey Zuniga    History of Presenting Illness     Chief Complaint   Patient presents with    Shortness of Breath    Chest Pain    Nausea       History Provided By:  Patient    HPI: Audrey Zuniga is a 52 y.o. female, with significant pmhx of anxiety, who presents via EMS to the ED with c/o chest discomfort, shortness of breath and nausea vomiting started earlier this evening. Patient notes she thought her symptoms were related to anxiety and took medicine earlier in the day without relief. Patient arrives with complaints of feeling cold, nausea, shortness of breath and fatigue. Patient also specifically denies any associated fevers, diarrhea, abd pain, changes in BM, urinary sxs, or headache. Social Hx: denies tobacco  denies EtOH , denies Illicit Drugs    There are no other complaints, changes, or physical findings at this time. PCP: None    No Known Allergies    Current Facility-Administered Medications   Medication Dose Route Frequency Provider Last Rate Last Dose    ondansetron (ZOFRAN) injection 4 mg  4 mg IntraVENous Q1H PRN Azar Ku MD         Current Outpatient Medications   Medication Sig Dispense Refill    ondansetron (Zofran ODT) 4 mg disintegrating tablet Take 1 Tab by mouth every eight (8) hours as needed for Nausea.  10 Tab 0    dicyclomine (BentyL) 10 mg capsule Take 1 Cap by mouth four (4) times daily for 5 days. 20 Cap 0    sertraline (Zoloft) 50 mg tablet Zoloft 50 mg tablet   Take one tablet everyday, considering increasing to 2 tablets daily after 1-2 weeks.  oxyCODONE-acetaminophen (PERCOCET) 5-325 mg per tablet Take 0.5-1 Tabs by mouth every six (6) hours as needed for Pain. Max Daily Amount: 4 Tabs. 16 Tab 0    naproxen (NAPROSYN) 500 mg tablet Take 1 Tab by mouth every twelve (12) hours as needed for Pain. 20 Tab 0       Past History     Past Medical History:  Past Medical History:   Diagnosis Date    Infertility     fertility treatments with last pregnancy    Postpartum depression        Past Surgical History:  Past Surgical History:   Procedure Laterality Date     DELIVERY ONLY       with 1st pregnancy       Family History:  History reviewed. No pertinent family history. Social History:  Social History     Tobacco Use    Smoking status: Current Every Day Smoker     Packs/day: 0.25    Smokeless tobacco: Never Used   Substance Use Topics    Alcohol use: Yes    Drug use: No       Allergies:  No Known Allergies      Review of Systems   Review of Systems   Constitutional: Negative. Negative for fever. Eyes: Negative. Respiratory: Positive for chest tightness. Negative for shortness of breath. Cardiovascular: Positive for chest pain. Gastrointestinal: Positive for nausea and vomiting. Negative for abdominal pain. Endocrine: Negative. Genitourinary: Negative. Negative for difficulty urinating, dysuria and hematuria. Musculoskeletal: Negative. Skin: Negative. Neurological: Negative. Psychiatric/Behavioral: Negative for suicidal ideas. All other systems reviewed and are negative. Physical Exam   Physical Exam  Vitals signs and nursing note reviewed. Constitutional:       General: She is not in acute distress. Appearance: She is well-developed. She is not diaphoretic. HENT:      Head: Normocephalic and atraumatic.       Nose: Nose normal.   Eyes:      General: No scleral icterus. Conjunctiva/sclera: Conjunctivae normal.   Neck:      Musculoskeletal: Normal range of motion. Trachea: No tracheal deviation. Cardiovascular:      Rate and Rhythm: Normal rate and regular rhythm. Heart sounds: Normal heart sounds. No murmur. No friction rub. Pulmonary:      Effort: Pulmonary effort is normal. No respiratory distress. Breath sounds: Normal breath sounds. No stridor. No wheezing or rales. Chest:       Abdominal:      General: Bowel sounds are normal. There is no distension. Palpations: Abdomen is soft. Tenderness: There is no abdominal tenderness. There is no rebound. Musculoskeletal: Normal range of motion. General: No tenderness. Skin:     General: Skin is warm and dry. Findings: No rash. Neurological:      Mental Status: She is alert and oriented to person, place, and time. Cranial Nerves: No cranial nerve deficit. Psychiatric:         Speech: Speech normal.         Behavior: Behavior normal.         Thought Content:  Thought content normal.         Judgment: Judgment normal.           Diagnostic Study Results     Labs -     Recent Results (from the past 12 hour(s))   EKG, 12 LEAD, INITIAL    Collection Time: 09/30/20  4:04 AM   Result Value Ref Range    Ventricular Rate 60 BPM    Atrial Rate 60 BPM    P-R Interval 152 ms    QRS Duration 86 ms    Q-T Interval 448 ms    QTC Calculation (Bezet) 448 ms    Calculated P Axis 76 degrees    Calculated R Axis 57 degrees    Calculated T Axis 51 degrees    Diagnosis       Normal sinus rhythm  Normal ECG  No previous ECGs available     CBC WITH AUTOMATED DIFF    Collection Time: 09/30/20  4:11 AM   Result Value Ref Range    WBC 17.6 (H) 3.6 - 11.0 K/uL    RBC 4.48 3.80 - 5.20 M/uL    HGB 13.9 11.5 - 16.0 g/dL    HCT 40.7 35.0 - 47.0 %    MCV 90.8 80.0 - 99.0 FL    MCH 31.0 26.0 - 34.0 PG    MCHC 34.2 30.0 - 36.5 g/dL    RDW 12.6 11.5 - 14.5 % PLATELET 890 942 - 457 K/uL    MPV 11.5 8.9 - 12.9 FL    NRBC 0.0 0  WBC    ABSOLUTE NRBC 0.00 0.00 - 0.01 K/uL    NEUTROPHILS 79 (H) 32 - 75 %    LYMPHOCYTES 14 12 - 49 %    MONOCYTES 5 5 - 13 %    EOSINOPHILS 1 0 - 7 %    BASOPHILS 0 0 - 1 %    IMMATURE GRANULOCYTES 1 (H) 0.0 - 0.5 %    ABS. NEUTROPHILS 14.0 (H) 1.8 - 8.0 K/UL    ABS. LYMPHOCYTES 2.4 0.8 - 3.5 K/UL    ABS. MONOCYTES 0.8 0.0 - 1.0 K/UL    ABS. EOSINOPHILS 0.1 0.0 - 0.4 K/UL    ABS. BASOPHILS 0.1 0.0 - 0.1 K/UL    ABS. IMM. GRANS. 0.1 (H) 0.00 - 0.04 K/UL    DF AUTOMATED     METABOLIC PANEL, COMPREHENSIVE    Collection Time: 09/30/20  4:11 AM   Result Value Ref Range    Sodium 138 136 - 145 mmol/L    Potassium 3.6 3.5 - 5.1 mmol/L    Chloride 107 97 - 108 mmol/L    CO2 24 21 - 32 mmol/L    Anion gap 7 5 - 15 mmol/L    Glucose 136 (H) 65 - 100 mg/dL    BUN 23 (H) 6 - 20 MG/DL    Creatinine 1.06 (H) 0.55 - 1.02 MG/DL    BUN/Creatinine ratio 22 (H) 12 - 20      GFR est AA >60 >60 ml/min/1.73m2    GFR est non-AA 55 (L) >60 ml/min/1.73m2    Calcium 8.9 8.5 - 10.1 MG/DL    Bilirubin, total 0.3 0.2 - 1.0 MG/DL    ALT (SGPT) 26 12 - 78 U/L    AST (SGOT) 14 (L) 15 - 37 U/L    Alk. phosphatase 65 45 - 117 U/L    Protein, total 7.3 6.4 - 8.2 g/dL    Albumin 3.8 3.5 - 5.0 g/dL    Globulin 3.5 2.0 - 4.0 g/dL    A-G Ratio 1.1 1.1 - 2.2     NT-PRO BNP    Collection Time: 09/30/20  4:11 AM   Result Value Ref Range    NT pro-BNP 54 <125 PG/ML   TROPONIN I    Collection Time: 09/30/20  4:11 AM   Result Value Ref Range    Troponin-I, Qt. <0.05 <0.05 ng/mL   LIPASE    Collection Time: 09/30/20  4:11 AM   Result Value Ref Range    Lipase 94 73 - 393 U/L   D DIMER    Collection Time: 09/30/20  5:30 AM   Result Value Ref Range    D-dimer 0.21 0.00 - 0.65 mg/L FEU       Radiologic Studies -   CT ABD PELV W CONT   Final Result   IMPRESSION:      1. Nonspecific pericholecystic fluid. 2. Small nonobstructing right renal calculus.       XR CHEST PORT   Final Result IMPRESSION:   No acute process. CT Results  (Last 48 hours)               09/30/20 0643  CT ABD PELV W CONT Final result    Impression:  IMPRESSION:       1. Nonspecific pericholecystic fluid. 2. Small nonobstructing right renal calculus. Narrative:  INDICATION: n/v, elevated wbc count       COMPARISON: None       TECHNIQUE:   Following the uneventful intravenous administration of IV contrast, thin axial   images were obtained through the abdomen and pelvis. Coronal and sagittal   reconstructions were generated. Oral contrast was not administered. CT dose   reduction was achieved through use of a standardized protocol tailored for this   examination and automatic exposure control for dose modulation. FINDINGS:   LUNG BASES: No abnormality. LIVER: No mass or biliary dilatation. GALLBLADDER: Pericholecystic fluid. SPLEEN: No enlargement or lesion. PANCREAS: No mass or ductal dilatation. ADRENALS: No mass. KIDNEYS: 2 mm nonobstructing calculus right kidney. Left kidney is normal.   GI TRACT: No bowel obstruction. Difficult to assess bowel wall thickening given   lack of oral contrast material.   PERITONEUM: No free air or free fluid. APPENDIX: Unremarkable. RETROPERITONEUM: No aortic aneurysm. LYMPH NODES: None enlarged. ADDITIONAL COMMENTS: N/A.       URINARY BLADDER: Unremarkable. REPRODUCTIVE ORGANS: Unremarkable. LYMPH NODES: None enlarged. FREE FLUID: None. BONES: No destructive bone lesion. ADDITIONAL COMMENTS: N/A. CXR Results  (Last 48 hours)               09/30/20 0440  XR CHEST PORT Final result    Impression:  IMPRESSION:   No acute process. Narrative:  INDICATION: chest pain       EXAM:  AP CHEST RADIOGRAPH       COMPARISON: None       FINDINGS:       AP portable view of the chest demonstrates a normal cardiomediastinal   silhouette. There is no edema, effusion, consolidation, or pneumothorax.  The   osseous structures are unremarkable. Medical Decision Making   I am the first provider for this patient. I reviewed the vital signs, available nursing notes, past medical history, past surgical history, family history and social history. Vital Signs-Reviewed the patient's vital signs. Patient Vitals for the past 12 hrs:   Temp Pulse Resp BP SpO2   09/30/20 0405 98 °F (36.7 °C) 64 22 108/65 100 %       Pulse Oximetry Analysis - 100% on RA    Cardiac Monitor:   Rate: 64 bpm  Rhythm: nsr      Provider Notes (Medical Decision Making):     DDX:  Gastritis, arrhythmia, ACS, PE, pneumonia, pancreatitis, UTI    Plan:  EKG, labs, antiemetic, IV fluids, chest x-ray, d-dimer, lipase, CT abdomen pelvis    Impression:  Acute cholecystitis    ED Course:   Initial assessment performed. The patients presenting problems have been discussed, and they are in agreement with the care plan formulated and outlined with them. I have encouraged them to ask questions as they arise throughout their visit. I reviewed our electronic medical record system for any past medical records that were available that may contribute to the patients current condition, the nursing notes and and vital signs from today's visit    Nursing notes will be reviewed as they become available in realtime while the pt has been in the ED. Randa Dyson MD    I personally reviewed/interpreted pt's imaging. Agree with official read by radiology as noted above. Randa Dyson MD    PROGRESS NOTE  7:00 AM  Patient feeling markedly improved from earlier. No longer feeling shortness of breath or chest pain. Notes that her mother has history of esophageal strictures and notes that her symptoms started after eating dinner last night. Patient has never been diagnosed with esophageal issues but believes that her symptoms stemmed from this and that she triggered a panic attack with the sensation that her food was not moving down her esophagus.   Patient is never been seen by GI but will be referred at time of discharge. 7:05 AM  Patient's presentation, labs/imaging and plan of care was reviewed with Dr. Mirna Moralez as part of sign out. They will f/u on ua and ct read as part of the plan discussed with the patient. Dr. Maci Khan assistance in completion of this plan is greatly appreciated but it should be noted that I will be the provider of record for this patient. Anup Alba MD             Critical Care Time:     none      Diagnosis     Clinical Impression:   1. Acute cholecystitis    2. Non-intractable vomiting with nausea, unspecified vomiting type    3. SOB (shortness of breath)    4. Atypical chest pain        PLAN:  1. Admit to general surgery          This note will not be viewable in [address]

## 2020-09-30 NOTE — H&P
Cholelithasis Consultation      Subjective:      William Vargas is an 52 y.o.  female who present to the ER with epigastric and substernal pain. Pain is sharp and stabbing. Pain started abrubtly last night. Nothing makes it better or worse. Pain is associated with vomiting. She denies fevers and chills. Patient states she has had progressive episodes of similar symptoms over the last months. Past Medical History:   Diagnosis Date    Infertility     fertility treatments with last pregnancy    Postpartum depression      History reviewed. No pertinent family history. Prior to Admission Medications   Prescriptions Last Dose Informant Patient Reported? Taking?   naproxen (NAPROSYN) 500 mg tablet Not Taking at Unknown time  No No   Sig: Take 1 Tab by mouth every twelve (12) hours as needed for Pain. oxyCODONE-acetaminophen (PERCOCET) 5-325 mg per tablet Not Taking at Unknown time  No No   Sig: Take 0.5-1 Tabs by mouth every six (6) hours as needed for Pain. Max Daily Amount: 4 Tabs. sertraline (Zoloft) 50 mg tablet   Yes No   Sig: Zoloft 50 mg tablet   Take one tablet everyday, considering increasing to 2 tablets daily after 1-2 weeks. Facility-Administered Medications: None     No Known Allergies  Social History     Socioeconomic History    Marital status:      Spouse name: Not on file    Number of children: Not on file    Years of education: Not on file    Highest education level: Not on file   Occupational History    Not on file   Social Needs    Financial resource strain: Not on file    Food insecurity     Worry: Not on file     Inability: Not on file    Transportation needs     Medical: Not on file     Non-medical: Not on file   Tobacco Use    Smoking status: Current Every Day Smoker     Packs/day: 0.25    Smokeless tobacco: Never Used   Substance and Sexual Activity    Alcohol use:  Yes    Drug use: No    Sexual activity: Yes     Partners: Male   Lifestyle    Physical activity     Days per week: Not on file     Minutes per session: Not on file    Stress: Not on file   Relationships    Social connections     Talks on phone: Not on file     Gets together: Not on file     Attends Jain service: Not on file     Active member of club or organization: Not on file     Attends meetings of clubs or organizations: Not on file     Relationship status: Not on file    Intimate partner violence     Fear of current or ex partner: Not on file     Emotionally abused: Not on file     Physically abused: Not on file     Forced sexual activity: Not on file   Other Topics Concern    Not on file   Social History Narrative    Not on file       Review of Systems: A comprehensive review of systems was negative except for that written in the HPI. Objective:        Visit Vitals  /73   Pulse 67   Temp 98 °F (36.7 °C)   Resp 10   Ht 5' 6\" (1.676 m)   Wt 72.6 kg (160 lb)   SpO2 97%   BMI 25.82 kg/m²        General appearance: alert, cooperative, no distress, appears stated age  Lungs: clear to auscultation bilaterally  Heart: regular rate and rhythm, S1, S2 normal, no murmur, click, rub or gallop  Abdomen: soft, non-tender.  Bowel sounds normal. No masses,  no organomegaly    Imaging:  images and reports reviewed    Lab Review:      Recent Results (from the past 24 hour(s))   EKG, 12 LEAD, INITIAL    Collection Time: 09/30/20  4:04 AM   Result Value Ref Range    Ventricular Rate 60 BPM    Atrial Rate 60 BPM    P-R Interval 152 ms    QRS Duration 86 ms    Q-T Interval 448 ms    QTC Calculation (Bezet) 448 ms    Calculated P Axis 76 degrees    Calculated R Axis 57 degrees    Calculated T Axis 51 degrees    Diagnosis       Normal sinus rhythm  Normal ECG  No previous ECGs available  Confirmed by Isabell Palacios M.D. ([id number]) on 9/30/2020 9:00:16 AM     CBC WITH AUTOMATED DIFF    Collection Time: 09/30/20  4:11 AM   Result Value Ref Range    WBC 17.6 (H) 3.6 - 11.0 K/uL    RBC 4.48 3.80 - 5.20 M/uL    HGB 13.9 11.5 - 16.0 g/dL    HCT 40.7 35.0 - 47.0 %    MCV 90.8 80.0 - 99.0 FL    MCH 31.0 26.0 - 34.0 PG    MCHC 34.2 30.0 - 36.5 g/dL    RDW 12.6 11.5 - 14.5 %    PLATELET 982 842 - 099 K/uL    MPV 11.5 8.9 - 12.9 FL    NRBC 0.0 0  WBC    ABSOLUTE NRBC 0.00 0.00 - 0.01 K/uL    NEUTROPHILS 79 (H) 32 - 75 %    LYMPHOCYTES 14 12 - 49 %    MONOCYTES 5 5 - 13 %    EOSINOPHILS 1 0 - 7 %    BASOPHILS 0 0 - 1 %    IMMATURE GRANULOCYTES 1 (H) 0.0 - 0.5 %    ABS. NEUTROPHILS 14.0 (H) 1.8 - 8.0 K/UL    ABS. LYMPHOCYTES 2.4 0.8 - 3.5 K/UL    ABS. MONOCYTES 0.8 0.0 - 1.0 K/UL    ABS. EOSINOPHILS 0.1 0.0 - 0.4 K/UL    ABS. BASOPHILS 0.1 0.0 - 0.1 K/UL    ABS. IMM. GRANS. 0.1 (H) 0.00 - 0.04 K/UL    DF AUTOMATED     METABOLIC PANEL, COMPREHENSIVE    Collection Time: 09/30/20  4:11 AM   Result Value Ref Range    Sodium 138 136 - 145 mmol/L    Potassium 3.6 3.5 - 5.1 mmol/L    Chloride 107 97 - 108 mmol/L    CO2 24 21 - 32 mmol/L    Anion gap 7 5 - 15 mmol/L    Glucose 136 (H) 65 - 100 mg/dL    BUN 23 (H) 6 - 20 MG/DL    Creatinine 1.06 (H) 0.55 - 1.02 MG/DL    BUN/Creatinine ratio 22 (H) 12 - 20      GFR est AA >60 >60 ml/min/1.73m2    GFR est non-AA 55 (L) >60 ml/min/1.73m2    Calcium 8.9 8.5 - 10.1 MG/DL    Bilirubin, total 0.3 0.2 - 1.0 MG/DL    ALT (SGPT) 26 12 - 78 U/L    AST (SGOT) 14 (L) 15 - 37 U/L    Alk.  phosphatase 65 45 - 117 U/L    Protein, total 7.3 6.4 - 8.2 g/dL    Albumin 3.8 3.5 - 5.0 g/dL    Globulin 3.5 2.0 - 4.0 g/dL    A-G Ratio 1.1 1.1 - 2.2     NT-PRO BNP    Collection Time: 09/30/20  4:11 AM   Result Value Ref Range    NT pro-BNP 54 <125 PG/ML   TROPONIN I    Collection Time: 09/30/20  4:11 AM   Result Value Ref Range    Troponin-I, Qt. <0.05 <0.05 ng/mL   LIPASE    Collection Time: 09/30/20  4:11 AM   Result Value Ref Range    Lipase 94 73 - 393 U/L   D DIMER    Collection Time: 09/30/20  5:30 AM   Result Value Ref Range    D-dimer 0.21 0.00 - 0.65 mg/L FEU   URINALYSIS W/ REFLEX CULTURE    Collection Time: 20  7:02 AM    Specimen: Urine   Result Value Ref Range    Color YELLOW/STRAW      Appearance CLEAR CLEAR      Specific gravity 1.010 1.003 - 1.030      pH (UA) 7.5 5.0 - 8.0      Protein Negative NEG mg/dL    Glucose Negative NEG mg/dL    Ketone Negative NEG mg/dL    Bilirubin Negative NEG      Blood Negative NEG      Urobilinogen 0.2 0.2 - 1.0 EU/dL    Nitrites Negative NEG      Leukocyte Esterase Negative NEG      UA:UC IF INDICATED CULTURE NOT INDICATED BY UA RESULT CNI      WBC 0-4 0 - 4 /hpf    RBC 0-5 0 - 5 /hpf    Epithelial cells FEW FEW /lpf    Bacteria Negative NEG /hpf    Hyaline cast 0-2 0 - 5 /lpf   DRUG SCREEN, URINE    Collection Time: 20  7:02 AM   Result Value Ref Range    AMPHETAMINES Negative NEG      BARBITURATES Negative NEG      BENZODIAZEPINES Negative NEG      COCAINE Negative NEG      METHADONE Negative NEG      OPIATES Negative NEG      PCP(PHENCYCLIDINE) Negative NEG      THC (TH-CANNABINOL) Negative NEG      Drug screen comment (NOTE)        Labs and radiology images and reports reviewed      Assessment:     52year old with acute cholecystitis     Plan/Recommendations/Medical Decision Makin. I recommend laparoscopic cholecystectomy with possible cholangiogram  Treatment alternatives were discussed. 2. Discussed aspects of surgical intervention, methods, risks (including by not limited to infection, bleeding, retained gallstones in the abdominal cavity and/or the main bile ducts, and injury to adjacent structures including the biliary system, common bile duct, and intestines.)  The patient understands the risks, any and all questions were answered to the patient's satisfaction. 3. Patient does wish to proceed with surgery.

## 2020-09-30 NOTE — ANESTHESIA POSTPROCEDURE EVALUATION
Procedure(s):  CHOLECYSTECTOMY LAPAROSCOPIC WITH INTRAOPERATIVE CHOLANGIOGRAM.    general    Anesthesia Post Evaluation        Patient location during evaluation: PACU  Note status: Adequate. Level of consciousness: responsive to verbal stimuli and sleepy but conscious  Pain management: satisfactory to patient  Airway patency: patent  Anesthetic complications: no  Cardiovascular status: acceptable  Respiratory status: acceptable  Hydration status: acceptable  Comments: +Post-Anesthesia Evaluation and Assessment    Patient: Tierra Blue MRN: [id number]  SSN: [id number]   YOB: 1970  Age: 52 y.o. Sex: female      Cardiovascular Function/Vital Signs    BP (!) 94/43   Pulse 65   Temp 36.7 °C (98.1 °F)   Resp 10   Ht 5' 6\" (1.676 m)   Wt 72.6 kg (160 lb)   SpO2 97%   BMI 25.82 kg/m²     Patient is status post Procedure(s):  CHOLECYSTECTOMY LAPAROSCOPIC WITH INTRAOPERATIVE CHOLANGIOGRAM.    Nausea/Vomiting: Controlled. Postoperative hydration reviewed and adequate. Pain:  Pain Scale 1: Visual (09/30/20 1430)  Pain Intensity 1: 0 (09/30/20 1430)   Managed. Neurological Status:   Neuro (WDL): Within Defined Limits (09/30/20 1358)   At baseline. Mental Status and Level of Consciousness: Arousable. Pulmonary Status:   O2 Device: Nasal cannula (09/30/20 1430)   Adequate oxygenation and airway patent. Complications related to anesthesia: None    Post-anesthesia assessment completed. No concerns. Signed By: Don Roach MD    9/30/2020  Post anesthesia nausea and vomiting:  controlled      INITIAL Post-op Vital signs:   Vitals Value Taken Time   BP 94/43 9/30/2020  3:15 PM   Temp 36.7 °C (98.1 °F) 9/30/2020  1:58 PM   Pulse 66 9/30/2020  3:19 PM   Resp 11 9/30/2020  3:19 PM   SpO2 97 % 9/30/2020  3:19 PM   Vitals shown include unvalidated device data.

## 2020-09-30 NOTE — PERIOP NOTES
TRANSFER - IN REPORT:    Verbal report received from [address]en Billings RN(name) on Jose Calloway  being received from ED room 23(unit) for ordered procedure      Report consisted of patients Situation, Background, Assessment and   Recommendations(SBAR). Information from the following report(s) SBAR, Kardex, Intake/Output, MAR, Recent Results and Cardiac Rhythm NSR was reviewed with the receiving nurse. Opportunity for questions and clarification was provided. Assessment completed upon patients arrival to unit and care assumed.

## 2020-09-30 NOTE — DISCHARGE INSTRUCTIONS
Patient Education        Gallbladder Removal Surgery: What to Expect at Home  Your Recovery  After your surgery, you will likely feel weak and tired for several days after you return home. Your belly may be swollen. If you had laparoscopic surgery, you may also have pain in your shoulder for about 24 hours. You may have gas or need to burp a lot at first. A few people get diarrhea. The diarrhea usually goes away in 2 to 4 weeks, but it may last longer. How quickly you recover depends on whether you had a laparoscopic or open surgery. · For a laparoscopic surgery, most people can go back to work or their normal routine in 1 to 2 weeks. But it may take longer, depending on the type of work you do. · For an open surgery, it will probably take 4 to 6 weeks before you get back to your normal routine. This care sheet gives you a general idea about how long it will take for you to recover. However, each person recovers at a different pace. Follow the steps below to get better as quickly as possible. How can you care for yourself at home? Activity    · Rest when you feel tired. Getting enough sleep will help you recover.     · Try to walk each day. Start out by walking a little more than you did the day before. Gradually increase the amount you walk. Walking boosts blood flow and helps prevent pneumonia and constipation.     · For about 2 to 4 weeks, avoid lifting anything that would make you strain. This may include a child, heavy grocery bags and milk containers, a heavy briefcase or backpack, cat litter or dog food bags, or a vacuum .     · Avoid strenuous activities, such as biking, jogging, weightlifting, and aerobic exercise, until your doctor says it is okay.     · You may shower 24 to 48 hours after surgery, if your doctor okays it. Pat the cut (incision) dry.  Do not take a bath for the first 2 weeks, or until your doctor tells you it is okay.     · You may drive when you are no longer taking pain medicine and can quickly move your foot from the gas pedal to the brake. You must also be able to sit comfortably for a long period of time, even if you do not plan to go far. You might get caught in traffic.     · For a laparoscopic surgery, most people can go back to work or their normal routine in 1 to 2 weeks, but it may take longer. For an open surgery, it will probably take 4 to 6 weeks before you get back to your normal routine.     · Your doctor will tell you when you can have sex again. Diet    · Eat smaller meals more often instead of fewer larger meals. You can eat a normal diet, but avoid eating fatty foods for about 1 month. Fatty foods include hamburger, whole milk, cheese, and many snack foods. If your stomach is upset, try bland, low-fat foods like plain rice, broiled chicken, toast, and yogurt.     · Drink plenty of fluids (unless your doctor tells you not to).   · If you have diarrhea, try avoiding spicy foods, dairy products, fatty foods, and alcohol. You can also watch to see if specific foods cause it, and stop eating them. If the diarrhea continues for more than 2 weeks, talk to your doctor.     · You may notice that your bowel movements are not regular right after your surgery. This is common. Try to avoid constipation and straining with bowel movements. You may want to take a fiber supplement every day. If you have not had a bowel movement after a couple of days, ask your doctor about taking a mild laxative. Medicines    · Your doctor will tell you if and when you can restart your medicines. He or she will also give you instructions about taking any new medicines.     · If you take aspirin or some other blood thinner, ask your doctor if and when to start taking it again. Make sure that you understand exactly what your doctor wants you to do.     · Take pain medicines exactly as directed. ? If the doctor gave you a prescription medicine for pain, take it as prescribed.   ? If you are not taking a prescription pain medicine, take an over-the-counter medicine such as acetaminophen (Tylenol), ibuprofen (Advil, Motrin), or naproxen (Aleve). Read and follow all instructions on the label. ? Do not take two or more pain medicines at the same time unless the doctor told you to. Many pain medicines contain acetaminophen, which is Tylenol. Too much Tylenol can be harmful.     · If you think your pain medicine is making you sick to your stomach:  ? Take your medicine after meals (unless your doctor tells you not to). ? Ask your doctor for a different pain medicine.     · If your doctor prescribed antibiotics, take them as directed. Do not stop taking them just because you feel better. You need to take the full course of antibiotics. Incision care    · If you have strips of tape on the incision, or cut, leave the tape on for a week or until it falls off.     · After 24 to 48 hours, wash the area daily with warm, soapy water, and pat it dry.     · You may have staples to hold the cut together. Keep them dry until your doctor takes them out. This is usually in 7 to 10 days.     · Keep the area clean and dry. You may cover it with a gauze bandage if it weeps or rubs against clothing. Change the bandage every day. Ice    · To reduce swelling and pain, put ice or a cold pack on your belly for 10 to 20 minutes at a time. Do this every 1 to 2 hours. Put a thin cloth between the ice and your skin. Follow-up care is a key part of your treatment and safety. Be sure to make and go to all appointments, and call your doctor if you are having problems. It's also a good idea to know your test results and keep a list of the medicines you take. When should you call for help? Call 911 anytime you think you may need emergency care. For example, call if:    · You passed out (lost consciousness).     · You are short of breath. .   Call your doctor now or seek immediate medical care if:    · You are sick to your stomach and cannot drink fluids.     · You have pain that does not get better when you take your pain medicine.     · You cannot pass stools or gas.     · You have signs of infection, such as:  ? Increased pain, swelling, warmth, or redness. ? Red streaks leading from the incision. ? Pus draining from the incision. ? A fever.     · Bright red blood has soaked through the bandage over your incision.     · You have loose stitches, or your incision comes open.     · You have signs of a blood clot in your leg (called a deep vein thrombosis), such as:  ? Pain in your calf, back of knee, thigh, or groin. ? Redness and swelling in your leg or groin. Watch closely for any changes in your health, and be sure to contact your doctor if you have any problems. Where can you learn more? Go to http://www.gray.com/  Enter F357 in the search box to learn more about \"Gallbladder Removal Surgery: What to Expect at Home. \"  Current as of: April 15, 2020               Content Version: 12.6  © 1018-0235 [employer], Incorporated. Care instructions adapted under license by [employer] (which disclaims liability or warranty for this information). If you have questions about a medical condition or this instruction, always ask your healthcare professional. [place] [address] any warranty or liability for your use of this information. Please call [phone] to make a follow up appointment with Ariella Nuñez or Judith Cordova in 2 weeks     [employer] [facility] of Karan Billings. [address] [facility], [address] [place] [address], [address] [facility]  [phone]  Fax [phone]

## 2020-09-30 NOTE — ANESTHESIA PREPROCEDURE EVALUATION
Relevant Problems   No relevant active problems       Anesthetic History   No history of anesthetic complications            Review of Systems / Medical History  Patient summary reviewed and pertinent labs reviewed    Pulmonary          Smoker         Neuro/Psych         Psychiatric history     Cardiovascular  Within defined limits                Exercise tolerance: >4 METS     GI/Hepatic/Renal  Within defined limits              Endo/Other      Hypothyroidism       Other Findings   Comments: Cholecystitis            Physical Exam    Airway  Mallampati: II  TM Distance: 4 - 6 cm  Neck ROM: normal range of motion   Mouth opening: Normal     Cardiovascular  Regular rate and rhythm,  S1 and S2 normal,  no murmur, click, rub, or gallop             Dental  No notable dental hx       Pulmonary  Breath sounds clear to auscultation               Abdominal  GI exam deferred       Other Findings            Anesthetic Plan    ASA: 2  Anesthesia type: general    Monitoring Plan: BIS      Induction: Intravenous  Anesthetic plan and risks discussed with: Patient

## 2020-09-30 NOTE — PERIOP NOTES
Handoff Report from Operating Room to PACU    Report received from [address] [place] [address] and [facility] regarding Bryan Grayson. Surgeon(s):  Jodeen Gitelman, MD  And Procedure(s) (LRB):  CHOLECYSTECTOMY LAPAROSCOPIC WITH INTRAOPERATIVE CHOLANGIOGRAM (N/A)  confirmed   with dressings discussed. Anesthesia type, drugs, patient history, complications, estimated blood loss, vital signs, intake and output, and last pain medication and reversal medications were reviewed.

## 2020-10-02 ENCOUNTER — TELEPHONE (OUTPATIENT)
Dept: SURGERY | Age: 50
End: 2020-10-02

## 2020-10-05 NOTE — TELEPHONE ENCOUNTER
Patient did not get the fax. Please call. Also wants a return to work note, and a copy of there operative report.

## 2020-10-06 NOTE — TELEPHONE ENCOUNTER
Patients mother in to get op notes, has several questions re daughter surgery. Mother not on hippa. Encouraged mom to have kevinhter to schedule appointment with provider on Friday. Express understanding.

## 2020-10-06 NOTE — PROGRESS NOTES
Pt sp Sept 30, 2020 lap cholecystectomy Dr Tricia Chang and pathology with gallstones and cholecystitis     SUBJECTIVE: Jaden Rabago is a 52 y.o. female is seen for a routine postop check. Reports no problems with the wound or other issues. Activity, diet and bowels are normal. No pain. OBJECTIVE: Appears well. Neuro C A and O x3  CV-  Reg rate  Lungs:  CTA  Abd  soft  NT  Wounds are well healed without complications or infection. ASSESSMENT: normal postdischarge course, doing well. PLAN: Patient is instructed to ad kojo activity . Return PRN for any problems or concerns. I reviewed pathology with her, no evidence of common bile duct stone she asked about a stone remaining and that must have been the right kidney stone was not obstructed she can let her PCP know this and take it from there. But from a gallbladder standpoint she is doing well and will return to work next week. Patient very pleased with her results.

## 2020-10-09 ENCOUNTER — OFFICE VISIT (OUTPATIENT)
Dept: SURGERY | Age: 50
End: 2020-10-09
Payer: COMMERCIAL

## 2020-10-09 VITALS
SYSTOLIC BLOOD PRESSURE: 122 MMHG | BODY MASS INDEX: 26.86 KG/M2 | HEART RATE: 91 BPM | OXYGEN SATURATION: 99 % | DIASTOLIC BLOOD PRESSURE: 67 MMHG | TEMPERATURE: 98.4 F | WEIGHT: 166.4 LBS

## 2020-10-09 DIAGNOSIS — K81.0 ACUTE CHOLECYSTITIS: Primary | ICD-10-CM

## 2020-10-09 PROCEDURE — 99024 POSTOP FOLLOW-UP VISIT: CPT | Performed by: SURGERY

## 2020-10-09 NOTE — LETTER
NOTIFICATION RETURN TO WORK / SCHOOL 
 
10/9/2020 1:20 PM 
 
Ms. Malcolm Aguilar [address] [facility] [address] To Whom It May Concern: 
 
Malcolm Aguilar is currently under the care of [address] . She will return to work/school on: Oct 12, 2020, excused since Sept 30, 2020 If there are questions or concerns please have the patient contact our office.  
 
 
 
Sincerely, 
 
 
Romulo Sorenson MD

## 2020-10-09 NOTE — PROGRESS NOTES
Chief Complaint   Patient presents with    Surgical Follow-up     laparoscopic cholecystectomy with Cholangiogram 9/30/20     1. Have you been to the ER, urgent care clinic since your last visit? Hospitalized since your last visit? No    2. Have you seen or consulted any other health care providers outside of the [address] [place] [address] since your last visit? Include any pap smears or colon screening.  No

## 2023-05-11 RX ORDER — ONDANSETRON 4 MG/1
TABLET, ORALLY DISINTEGRATING ORAL EVERY 8 HOURS PRN
COMMUNITY
Start: 2020-09-30

## 2023-05-11 RX ORDER — NAPROXEN 500 MG/1
TABLET ORAL
COMMUNITY
Start: 2017-12-30

## 2024-06-20 ENCOUNTER — HOSPITAL ENCOUNTER (EMERGENCY)
Facility: HOSPITAL | Age: 54
Discharge: HOME | End: 2024-06-20
Attending: EMERGENCY MEDICINE
Payer: COMMERCIAL

## 2024-06-20 ENCOUNTER — APPOINTMENT (OUTPATIENT)
Facility: HOSPITAL | Age: 54
End: 2024-06-20
Payer: COMMERCIAL

## 2024-06-20 VITALS
OXYGEN SATURATION: 98 % | HEART RATE: 69 BPM | RESPIRATION RATE: 20 BRPM | TEMPERATURE: 98.2 F | DIASTOLIC BLOOD PRESSURE: 67 MMHG | HEIGHT: 66 IN | WEIGHT: 169.75 LBS | SYSTOLIC BLOOD PRESSURE: 103 MMHG | BODY MASS INDEX: 27.28 KG/M2

## 2024-06-20 DIAGNOSIS — M54.6 PAIN IN THORACIC SPINE: Primary | ICD-10-CM

## 2024-06-20 DIAGNOSIS — R20.2 PARESTHESIAS: ICD-10-CM

## 2024-06-20 LAB
ALBUMIN SERPL-MCNC: 3.7 G/DL (ref 3.5–5)
ALBUMIN/GLOB SERPL: 1.1 (ref 1.1–2.2)
ALP SERPL-CCNC: 87 U/L (ref 45–117)
ALT SERPL-CCNC: 40 U/L (ref 12–78)
ANION GAP SERPL CALC-SCNC: 3 MMOL/L (ref 5–15)
AST SERPL-CCNC: 18 U/L (ref 15–37)
BASOPHILS # BLD: 0.1 K/UL (ref 0–0.1)
BASOPHILS NFR BLD: 1 % (ref 0–1)
BILIRUB SERPL-MCNC: 0.6 MG/DL (ref 0.2–1)
BUN SERPL-MCNC: 9 MG/DL (ref 6–20)
BUN/CREAT SERPL: 11 (ref 12–20)
CALCIUM SERPL-MCNC: 9.5 MG/DL (ref 8.5–10.1)
CHLORIDE SERPL-SCNC: 110 MMOL/L (ref 97–108)
CO2 SERPL-SCNC: 27 MMOL/L (ref 21–32)
COMMENT:: NORMAL
CREAT SERPL-MCNC: 0.85 MG/DL (ref 0.55–1.02)
D DIMER PPP FEU-MCNC: <0.19 MG/L FEU (ref 0–0.65)
DIFFERENTIAL METHOD BLD: NORMAL
EOSINOPHIL # BLD: 0.3 K/UL (ref 0–0.4)
EOSINOPHIL NFR BLD: 3 % (ref 0–7)
ERYTHROCYTE [DISTWIDTH] IN BLOOD BY AUTOMATED COUNT: 13 % (ref 11.5–14.5)
GLOBULIN SER CALC-MCNC: 3.5 G/DL (ref 2–4)
GLUCOSE SERPL-MCNC: 112 MG/DL (ref 65–100)
HCT VFR BLD AUTO: 45.5 % (ref 35–47)
HGB BLD-MCNC: 15 G/DL (ref 11.5–16)
IMM GRANULOCYTES # BLD AUTO: 0 K/UL (ref 0–0.04)
IMM GRANULOCYTES NFR BLD AUTO: 0 % (ref 0–0.5)
LYMPHOCYTES # BLD: 2.5 K/UL (ref 0.8–3.5)
LYMPHOCYTES NFR BLD: 24 % (ref 12–49)
MCH RBC QN AUTO: 30.7 PG (ref 26–34)
MCHC RBC AUTO-ENTMCNC: 33 G/DL (ref 30–36.5)
MCV RBC AUTO: 93 FL (ref 80–99)
MONOCYTES # BLD: 0.6 K/UL (ref 0–1)
MONOCYTES NFR BLD: 6 % (ref 5–13)
NEUTS SEG # BLD: 7.2 K/UL (ref 1.8–8)
NEUTS SEG NFR BLD: 66 % (ref 32–75)
NRBC # BLD: 0 K/UL (ref 0–0.01)
NRBC BLD-RTO: 0 PER 100 WBC
PLATELET # BLD AUTO: 264 K/UL (ref 150–400)
PMV BLD AUTO: 11.3 FL (ref 8.9–12.9)
POTASSIUM SERPL-SCNC: 4.5 MMOL/L (ref 3.5–5.1)
PROT SERPL-MCNC: 7.2 G/DL (ref 6.4–8.2)
RBC # BLD AUTO: 4.89 M/UL (ref 3.8–5.2)
SODIUM SERPL-SCNC: 140 MMOL/L (ref 136–145)
SPECIMEN HOLD: NORMAL
TROPONIN I SERPL HS-MCNC: <4 NG/L (ref 0–51)
TROPONIN I SERPL HS-MCNC: <4 NG/L (ref 0–51)
WBC # BLD AUTO: 10.7 K/UL (ref 3.6–11)

## 2024-06-20 PROCEDURE — 99285 EMERGENCY DEPT VISIT HI MDM: CPT

## 2024-06-20 PROCEDURE — 85025 COMPLETE CBC W/AUTO DIFF WBC: CPT

## 2024-06-20 PROCEDURE — 71045 X-RAY EXAM CHEST 1 VIEW: CPT

## 2024-06-20 PROCEDURE — 36415 COLL VENOUS BLD VENIPUNCTURE: CPT

## 2024-06-20 PROCEDURE — 85379 FIBRIN DEGRADATION QUANT: CPT

## 2024-06-20 PROCEDURE — 80053 COMPREHEN METABOLIC PANEL: CPT

## 2024-06-20 PROCEDURE — 84484 ASSAY OF TROPONIN QUANT: CPT

## 2024-06-20 RX ORDER — METHOCARBAMOL 500 MG/1
500 TABLET, FILM COATED ORAL 3 TIMES DAILY
Qty: 21 TABLET | Refills: 0 | Status: SHIPPED | OUTPATIENT
Start: 2024-06-20 | End: 2024-06-27

## 2024-06-20 RX ORDER — NAPROXEN 500 MG/1
500 TABLET ORAL 2 TIMES DAILY
Qty: 20 TABLET | Refills: 0 | Status: SHIPPED | OUTPATIENT
Start: 2024-06-20 | End: 2024-06-30

## 2024-06-20 ASSESSMENT — PAIN - FUNCTIONAL ASSESSMENT
PAIN_FUNCTIONAL_ASSESSMENT: ACTIVITIES ARE NOT PREVENTED
PAIN_FUNCTIONAL_ASSESSMENT: 0-10

## 2024-06-20 ASSESSMENT — PAIN SCALES - GENERAL
PAINLEVEL_OUTOF10: 2
PAINLEVEL_OUTOF10: 4

## 2024-06-20 ASSESSMENT — PAIN DESCRIPTION - LOCATION
LOCATION: CHEST
LOCATION: CHEST

## 2024-06-20 ASSESSMENT — LIFESTYLE VARIABLES
HOW MANY STANDARD DRINKS CONTAINING ALCOHOL DO YOU HAVE ON A TYPICAL DAY: 5 OR 6
HOW OFTEN DO YOU HAVE A DRINK CONTAINING ALCOHOL: 2-3 TIMES A WEEK

## 2024-06-20 ASSESSMENT — HEART SCORE: ECG: NORMAL

## 2024-06-20 ASSESSMENT — PAIN DESCRIPTION - DESCRIPTORS
DESCRIPTORS: TIGHTNESS
DESCRIPTORS: TIGHTNESS

## 2024-06-20 ASSESSMENT — PAIN DESCRIPTION - ORIENTATION
ORIENTATION: MID
ORIENTATION: MID

## 2024-06-20 ASSESSMENT — PAIN DESCRIPTION - FREQUENCY: FREQUENCY: INTERMITTENT

## 2024-06-20 ASSESSMENT — PAIN DESCRIPTION - ONSET: ONSET: ON-GOING

## 2024-06-20 ASSESSMENT — PAIN DESCRIPTION - PAIN TYPE: TYPE: ACUTE PAIN

## 2024-06-20 NOTE — ED NOTES
Pt's IV removed. Pt provided D/C instructions including but not limited to follow-up care, medications, how to access MyChart and verbalizes understanding of D/C teaching. Pt has all belongings. Pt ambulates self out of ED to personal transport at this time.  Patient is A&Ox4, on RA, VSS, and is in NAD at the time of discharge.

## 2024-06-20 NOTE — ED PROVIDER NOTES
Pulmonary effort is normal.   Abdominal:      General: Abdomen is flat. Bowel sounds are normal.      Tenderness: There is no abdominal tenderness.   Skin:     General: Skin is warm and dry.   Neurological:      General: No focal deficit present.      Mental Status: She is alert.       Diagnostic Study Results     LABS:  Results for orders placed or performed during the hospital encounter of 06/20/24   CBC with Auto Differential   Result Value Ref Range    WBC 10.7 3.6 - 11.0 K/uL    RBC 4.89 3.80 - 5.20 M/uL    Hemoglobin 15.0 11.5 - 16.0 g/dL    Hematocrit 45.5 35.0 - 47.0 %    MCV 93.0 80.0 - 99.0 FL    MCH 30.7 26.0 - 34.0 PG    MCHC 33.0 30.0 - 36.5 g/dL    RDW 13.0 11.5 - 14.5 %    Platelets 264 150 - 400 K/uL    MPV 11.3 8.9 - 12.9 FL    Nucleated RBCs 0.0 0  WBC    nRBC 0.00 0.00 - 0.01 K/uL    Neutrophils % 66 32 - 75 %    Lymphocytes % 24 12 - 49 %    Monocytes % 6 5 - 13 %    Eosinophils % 3 0 - 7 %    Basophils % 1 0 - 1 %    Immature Granulocytes % 0 0.0 - 0.5 %    Neutrophils Absolute 7.2 1.8 - 8.0 K/UL    Lymphocytes Absolute 2.5 0.8 - 3.5 K/UL    Monocytes Absolute 0.6 0.0 - 1.0 K/UL    Eosinophils Absolute 0.3 0.0 - 0.4 K/UL    Basophils Absolute 0.1 0.0 - 0.1 K/UL    Immature Granulocytes Absolute 0.0 0.00 - 0.04 K/UL    Differential Type AUTOMATED     Comprehensive Metabolic Panel w/ Reflex to MG   Result Value Ref Range    Sodium 140 136 - 145 mmol/L    Potassium 4.5 3.5 - 5.1 mmol/L    Chloride 110 (H) 97 - 108 mmol/L    CO2 27 21 - 32 mmol/L    Anion Gap 3 (L) 5 - 15 mmol/L    Glucose 112 (H) 65 - 100 mg/dL    BUN 9 6 - 20 MG/DL    Creatinine 0.85 0.55 - 1.02 MG/DL    BUN/Creatinine Ratio 11 (L) 12 - 20      Est, Glom Filt Rate 82 >60 ml/min/1.73m2    Calcium 9.5 8.5 - 10.1 MG/DL    Total Bilirubin 0.6 0.2 - 1.0 MG/DL    ALT 40 12 - 78 U/L    AST 18 15 - 37 U/L    Alk Phosphatase 87 45 - 117 U/L    Total Protein 7.2 6.4 - 8.2 g/dL    Albumin 3.7 3.5 - 5.0 g/dL    Globulin 3.5 2.0 - 4.0

## 2024-06-23 LAB
EKG ATRIAL RATE: 67 BPM
EKG ATRIAL RATE: 67 BPM
EKG DIAGNOSIS: NORMAL
EKG DIAGNOSIS: NORMAL
EKG P AXIS: -3 DEGREES
EKG P AXIS: 2 DEGREES
EKG P-R INTERVAL: 122 MS
EKG P-R INTERVAL: 130 MS
EKG Q-T INTERVAL: 386 MS
EKG Q-T INTERVAL: 418 MS
EKG QRS DURATION: 82 MS
EKG QRS DURATION: 86 MS
EKG QTC CALCULATION (BAZETT): 407 MS
EKG QTC CALCULATION (BAZETT): 441 MS
EKG R AXIS: 19 DEGREES
EKG R AXIS: 44 DEGREES
EKG T AXIS: 32 DEGREES
EKG T AXIS: 52 DEGREES
EKG VENTRICULAR RATE: 67 BPM
EKG VENTRICULAR RATE: 67 BPM

## 2025-07-30 ENCOUNTER — HOSPITAL ENCOUNTER (EMERGENCY)
Facility: HOSPITAL | Age: 55
Discharge: HOME | End: 2025-07-30
Attending: STUDENT IN AN ORGANIZED HEALTH CARE EDUCATION/TRAINING PROGRAM
Payer: COMMERCIAL

## 2025-07-30 VITALS
RESPIRATION RATE: 17 BRPM | TEMPERATURE: 97.9 F | HEART RATE: 78 BPM | HEIGHT: 65 IN | OXYGEN SATURATION: 99 % | BODY MASS INDEX: 27.33 KG/M2 | SYSTOLIC BLOOD PRESSURE: 125 MMHG | DIASTOLIC BLOOD PRESSURE: 68 MMHG | WEIGHT: 164.02 LBS

## 2025-07-30 DIAGNOSIS — H81.399 PERIPHERAL VERTIGO, UNSPECIFIED LATERALITY: Primary | ICD-10-CM

## 2025-07-30 LAB
ALBUMIN SERPL-MCNC: 3.9 G/DL (ref 3.5–5.2)
ALBUMIN/GLOB SERPL: 1.3 (ref 1.1–2.2)
ALP SERPL-CCNC: 80 U/L (ref 35–104)
ALT SERPL-CCNC: 45 U/L (ref 10–35)
ANION GAP SERPL CALC-SCNC: 13 MMOL/L (ref 2–14)
AST SERPL-CCNC: 32 U/L (ref 10–35)
BASOPHILS # BLD: 0.04 K/UL (ref 0–0.1)
BASOPHILS NFR BLD: 0.5 % (ref 0–1)
BILIRUB SERPL-MCNC: 0.3 MG/DL (ref 0–1.2)
BUN SERPL-MCNC: 10 MG/DL (ref 6–20)
BUN/CREAT SERPL: 12 (ref 12–20)
CALCIUM SERPL-MCNC: 9.5 MG/DL (ref 8.6–10)
CHLORIDE SERPL-SCNC: 104 MMOL/L (ref 98–107)
CO2 SERPL-SCNC: 22 MMOL/L (ref 20–29)
CREAT SERPL-MCNC: 0.79 MG/DL (ref 0.6–1)
DIFFERENTIAL METHOD BLD: ABNORMAL
EOSINOPHIL # BLD: 0.28 K/UL (ref 0–0.4)
EOSINOPHIL NFR BLD: 3.6 % (ref 0–7)
ERYTHROCYTE [DISTWIDTH] IN BLOOD BY AUTOMATED COUNT: 12.8 % (ref 11.5–14.5)
GLOBULIN SER CALC-MCNC: 3.1 G/DL (ref 2–4)
GLUCOSE SERPL-MCNC: 107 MG/DL (ref 65–100)
HCT VFR BLD AUTO: 44.7 % (ref 35–47)
HGB BLD-MCNC: 14.5 G/DL (ref 11.5–16)
IMM GRANULOCYTES # BLD AUTO: 0.05 K/UL (ref 0–0.04)
IMM GRANULOCYTES NFR BLD AUTO: 0.6 % (ref 0–0.5)
LYMPHOCYTES # BLD: 2.2 K/UL (ref 0.8–3.5)
LYMPHOCYTES NFR BLD: 28.6 % (ref 12–49)
MAGNESIUM SERPL-MCNC: 2 MG/DL (ref 1.6–2.6)
MCH RBC QN AUTO: 30.7 PG (ref 26–34)
MCHC RBC AUTO-ENTMCNC: 32.4 G/DL (ref 30–36.5)
MCV RBC AUTO: 94.7 FL (ref 80–99)
MONOCYTES # BLD: 0.51 K/UL (ref 0–1)
MONOCYTES NFR BLD: 6.6 % (ref 5–13)
NEUTS SEG # BLD: 4.62 K/UL (ref 1.8–8)
NEUTS SEG NFR BLD: 60.1 % (ref 32–75)
NRBC # BLD: 0 K/UL (ref 0–0.01)
NRBC BLD-RTO: 0 PER 100 WBC
PLATELET # BLD AUTO: 236 K/UL (ref 150–400)
PMV BLD AUTO: 11 FL (ref 8.9–12.9)
POTASSIUM SERPL-SCNC: 3.8 MMOL/L (ref 3.5–5.1)
PROT SERPL-MCNC: 7 G/DL (ref 6.4–8.3)
RBC # BLD AUTO: 4.72 M/UL (ref 3.8–5.2)
SODIUM SERPL-SCNC: 138 MMOL/L (ref 136–145)
WBC # BLD AUTO: 7.7 K/UL (ref 3.6–11)

## 2025-07-30 PROCEDURE — 83735 ASSAY OF MAGNESIUM: CPT

## 2025-07-30 PROCEDURE — 80053 COMPREHEN METABOLIC PANEL: CPT

## 2025-07-30 PROCEDURE — 96374 THER/PROPH/DIAG INJ IV PUSH: CPT

## 2025-07-30 PROCEDURE — 6360000002 HC RX W HCPCS: Performed by: STUDENT IN AN ORGANIZED HEALTH CARE EDUCATION/TRAINING PROGRAM

## 2025-07-30 PROCEDURE — 99284 EMERGENCY DEPT VISIT MOD MDM: CPT

## 2025-07-30 PROCEDURE — 96375 TX/PRO/DX INJ NEW DRUG ADDON: CPT

## 2025-07-30 PROCEDURE — 2580000003 HC RX 258: Performed by: STUDENT IN AN ORGANIZED HEALTH CARE EDUCATION/TRAINING PROGRAM

## 2025-07-30 PROCEDURE — 93005 ELECTROCARDIOGRAM TRACING: CPT | Performed by: STUDENT IN AN ORGANIZED HEALTH CARE EDUCATION/TRAINING PROGRAM

## 2025-07-30 PROCEDURE — 6370000000 HC RX 637 (ALT 250 FOR IP): Performed by: STUDENT IN AN ORGANIZED HEALTH CARE EDUCATION/TRAINING PROGRAM

## 2025-07-30 PROCEDURE — 36415 COLL VENOUS BLD VENIPUNCTURE: CPT

## 2025-07-30 PROCEDURE — 85025 COMPLETE CBC W/AUTO DIFF WBC: CPT

## 2025-07-30 RX ORDER — ONDANSETRON 2 MG/ML
4 INJECTION INTRAMUSCULAR; INTRAVENOUS ONCE
Status: COMPLETED | OUTPATIENT
Start: 2025-07-30 | End: 2025-07-30

## 2025-07-30 RX ORDER — DIAZEPAM 10 MG/2ML
5 INJECTION, SOLUTION INTRAMUSCULAR; INTRAVENOUS ONCE
Status: COMPLETED | OUTPATIENT
Start: 2025-07-30 | End: 2025-07-30

## 2025-07-30 RX ORDER — MECLIZINE HYDROCHLORIDE 25 MG/1
25 TABLET ORAL 3 TIMES DAILY PRN
Qty: 30 TABLET | Refills: 0 | Status: SHIPPED | OUTPATIENT
Start: 2025-07-30 | End: 2025-08-09

## 2025-07-30 RX ORDER — MECLIZINE HCL 12.5 MG 12.5 MG/1
25 TABLET ORAL
Status: COMPLETED | OUTPATIENT
Start: 2025-07-30 | End: 2025-07-30

## 2025-07-30 RX ORDER — 0.9 % SODIUM CHLORIDE 0.9 %
1000 INTRAVENOUS SOLUTION INTRAVENOUS ONCE
Status: COMPLETED | OUTPATIENT
Start: 2025-07-30 | End: 2025-07-30

## 2025-07-30 RX ORDER — ONDANSETRON 4 MG/1
4 TABLET, ORALLY DISINTEGRATING ORAL 3 TIMES DAILY PRN
Qty: 21 TABLET | Refills: 0 | Status: SHIPPED | OUTPATIENT
Start: 2025-07-30

## 2025-07-30 RX ADMIN — DIAZEPAM 5 MG: 5 INJECTION, SOLUTION INTRAMUSCULAR; INTRAVENOUS at 13:15

## 2025-07-30 RX ADMIN — MECLIZINE 25 MG: 12.5 TABLET ORAL at 14:30

## 2025-07-30 RX ADMIN — ONDANSETRON 4 MG: 2 INJECTION, SOLUTION INTRAMUSCULAR; INTRAVENOUS at 12:44

## 2025-07-30 RX ADMIN — SODIUM CHLORIDE 1000 ML: 9 INJECTION, SOLUTION INTRAVENOUS at 12:45

## 2025-07-30 ASSESSMENT — PAIN SCALES - GENERAL: PAINLEVEL_OUTOF10: 0

## 2025-07-30 NOTE — ED NOTES
Pt states she feels \"100% better\". Able to ambulate to bathroom and denies dizziness. Passed PO challenge att.

## 2025-07-30 NOTE — DISCHARGE INSTRUCTIONS
Thank You!    It was a pleasure taking care of you in our Emergency Department today. We know that when you come to our Emergency Department, you are entrusting us with your health, comfort, and safety. Our physicians and nurses honor that trust, and truly appreciate the opportunity to care for you and your loved ones.      We also value your feedback. If you receive a survey about your Emergency Department experience today, please fill it out.  We care about our patients' feedback, and we listen to what you have to say.  Thank you.    Wilmar Nguyen MD  ________________________________________________________________________  I have included a copy of your lab results and/or radiologic studies from today's visit so you can have them easily available at your follow-up visit. We hope you feel better and please do not hesitate to contact the ED if you have any questions at all!    Recent Results (from the past 12 hours)   EKG 12 Lead    Collection Time: 07/30/25 11:49 AM   Result Value Ref Range    Ventricular Rate 59 BPM    Atrial Rate 59 BPM    P-R Interval 150 ms    QRS Duration 88 ms    Q-T Interval 446 ms    QTc Calculation (Bazett) 441 ms    P Axis 62 degrees    R Axis 75 degrees    T Axis 73 degrees    Diagnosis       Sinus bradycardia  When compared with ECG of 20-Jun-2024 11:21,  Questionable change in QRS axis  Nonspecific T wave abnormality no longer evident in Inferior leads     CBC with Auto Differential    Collection Time: 07/30/25 12:33 PM   Result Value Ref Range    WBC 7.7 3.6 - 11.0 K/uL    RBC 4.72 3.80 - 5.20 M/uL    Hemoglobin 14.5 11.5 - 16.0 g/dL    Hematocrit 44.7 35.0 - 47.0 %    MCV 94.7 80.0 - 99.0 FL    MCH 30.7 26.0 - 34.0 PG    MCHC 32.4 30.0 - 36.5 g/dL    RDW 12.8 11.5 - 14.5 %    Platelets 236 150 - 400 K/uL    MPV 11.0 8.9 - 12.9 FL    Nucleated RBCs 0.0 0  WBC    nRBC 0.00 0.00 - 0.01 K/uL    Neutrophils % 60.1 32.0 - 75.0 %    Lymphocytes % 28.6 12.0 - 49.0 %    Monocytes %  6.6 5.0 - 13.0 %    Eosinophils % 3.6 0.0 - 7.0 %    Basophils % 0.5 0.0 - 1.0 %    Immature Granulocytes % 0.6 (H) 0.0 - 0.5 %    Neutrophils Absolute 4.62 1.80 - 8.00 K/UL    Lymphocytes Absolute 2.20 0.80 - 3.50 K/UL    Monocytes Absolute 0.51 0.00 - 1.00 K/UL    Eosinophils Absolute 0.28 0.00 - 0.40 K/UL    Basophils Absolute 0.04 0.00 - 0.10 K/UL    Immature Granulocytes Absolute 0.05 (H) 0.00 - 0.04 K/UL    Differential Type AUTOMATED     Comprehensive Metabolic Panel    Collection Time: 07/30/25 12:33 PM   Result Value Ref Range    Sodium 138 136 - 145 mmol/L    Potassium 3.8 3.5 - 5.1 mmol/L    Chloride 104 98 - 107 mmol/L    CO2 22 20 - 29 mmol/L    Anion Gap 13 2 - 14 mmol/L    Glucose 107 (H) 65 - 100 mg/dL    BUN 10 6 - 20 MG/DL    Creatinine 0.79 0.60 - 1.00 MG/DL    BUN/Creatinine Ratio 12 12 - 20      Est, Glom Filt Rate 89 (L) >90 ml/min/1.73m2    Calcium 9.5 8.6 - 10.0 MG/DL    Total Bilirubin 0.3 0.0 - 1.2 MG/DL    ALT 45 (H) 10 - 35 U/L    AST 32 10 - 35 U/L    Alk Phosphatase 80 35 - 104 U/L    Total Protein 7.0 6.4 - 8.3 g/dL    Albumin 3.9 3.5 - 5.2 g/dL    Globulin 3.1 2.0 - 4.0 g/dL    Albumin/Globulin Ratio 1.3 1.1 - 2.2     Magnesium    Collection Time: 07/30/25 12:33 PM   Result Value Ref Range    Magnesium 2.0 1.6 - 2.6 mg/dL     No orders to display       The exam and treatment you received in the Emergency Department were for an urgent problem and are not intended as complete care. It is important that you follow up with a doctor, nurse practitioner, or physician assistant for ongoing care. If your symptoms become worse or you do not improve as expected and you are unable to reach your usual health care provider, you should return to the Emergency Department. We are available 24 hours a day.    Please take your discharge instructions with you when you go to your follow-up appointment.     If a prescription has been provided, please have it filled as soon as possible to prevent a delay

## 2025-08-01 LAB
EKG ATRIAL RATE: 59 BPM
EKG DIAGNOSIS: NORMAL
EKG P AXIS: 62 DEGREES
EKG P-R INTERVAL: 150 MS
EKG Q-T INTERVAL: 446 MS
EKG QRS DURATION: 88 MS
EKG QTC CALCULATION (BAZETT): 441 MS
EKG R AXIS: 75 DEGREES
EKG T AXIS: 73 DEGREES
EKG VENTRICULAR RATE: 59 BPM

## 2025-08-11 NOTE — ED PROVIDER NOTES
[place] [address]  [phone]    If symptoms worsen    Erin Carrillo MD  [address] [address]  [phone]    In 2 days           DISCHARGE MEDICATIONS:     Medication List        START taking these medications      ondansetron 4 MG disintegrating tablet  Commonly known as: ZOFRAN-ODT  Take 1 tablet by mouth 3 times daily as needed for Nausea or Vomiting            ASK your doctor about these medications      meclizine 25 MG tablet  Commonly known as: ANTIVERT  Take 1 tablet by mouth 3 times daily as needed for Dizziness  Ask about: Should I take this medication?     naproxen 500 MG tablet  Commonly known as: Naprosyn  Take 1 tablet by mouth 2 times daily for 10 days     sertraline 50 MG tablet  Commonly known as: ZOLOFT               Where to Get Your Medications        These medications were sent to [id number] - [place], [place] [facility] [address] [facility] P [phone] - F [phone] [address] [facility] [address]      Phone: [phone]   meclizine 25 MG tablet  ondansetron 4 MG disintegrating tablet           DISCONTINUED MEDICATIONS:  Discharge Medication List as of 7/30/2025  2:43 PM          I am the Primary Clinician of Record.   Wilmar Nguyen MD (electronically signed)      (Please note that parts of this dictation were completed with voice recognition software. Quite often unanticipated grammatical, syntax, homophones, and other interpretive errors are inadvertently transcribed by the computer software. Please disregards these errors. Please excuse any errors that have escaped final proofreading.)         Wilmar Nguyen MD  08/16/25 [id number]

## (undated) DEVICE — STRAP,POSITIONING,KNEE/BODY,FOAM,4X60": Brand: MEDLINE

## (undated) DEVICE — COVER LT HNDL PLAS RIG 1 PER PK

## (undated) DEVICE — TROCAR: Brand: KII FIOS FIRST ENTRY

## (undated) DEVICE — CANISTER, RIGID, 3000CC: Brand: MEDLINE INDUSTRIES, INC.

## (undated) DEVICE — APPLIER CLP M/L SHFT DIA5MM 15 LIG LIGAMAX 5

## (undated) DEVICE — NEEDLE HYPO 22GA L1.5IN BLK S STL HUB POLYPR SHLD REG BVL

## (undated) DEVICE — TROCAR: Brand: KII® SLEEVE

## (undated) DEVICE — CATHETER IV 14GA L2IN POLYUR STR ORNG HUB SFTY RADPQ DISP

## (undated) DEVICE — E-Z CLEAN, PTFE COATED, ELECTROSURGICAL LAPAROSCOPIC ELECTRODE, L-HOOK, 33 CM., SINGLE-USE, FOR USE WITH HAND CONTROL PENCIL: Brand: MEGADYNE

## (undated) DEVICE — STERILE POLYISOPRENE POWDER-FREE SURGICAL GLOVES: Brand: PROTEXIS

## (undated) DEVICE — TROCAR SITE CLOSURE DEVICE: Brand: ENDO CLOSE

## (undated) DEVICE — GARMENT,MEDLINE,DVT,INT,CALF,MED, GEN2: Brand: MEDLINE

## (undated) DEVICE — SOL IRRIGATION INJ NACL 0.9% 500ML BTL

## (undated) DEVICE — DEVICE TRNSF SPIK STL 2008S] MICROTEK MEDICAL INC]

## (undated) DEVICE — Device

## (undated) DEVICE — BAG SPEC REM 224ML W4XL6IN DIA10MM 1 HND GYN DISP ENDOPCH

## (undated) DEVICE — SUTURE MCRYL SZ 4-0 L27IN ABSRB UD L19MM PS-2 1/2 CIR PRIM Y426H

## (undated) DEVICE — SUTURE SZ 0 27IN 5/8 CIR UR-6  TAPER PT VIOLET ABSRB VICRYL J603H

## (undated) DEVICE — SYR 20ML LL STRL LF --

## (undated) DEVICE — INFECTION CONTROL KIT SYS

## (undated) DEVICE — REM POLYHESIVE ADULT PATIENT RETURN ELECTRODE: Brand: VALLEYLAB

## (undated) DEVICE — PREP SKN CHLRAPRP APL 26ML STR --

## (undated) DEVICE — DRAPE XR C ARM 41X74IN LF --

## (undated) DEVICE — LAPAROSCOPIC TROCAR SLEEVE/SINGLE USE: Brand: KII® OPTICAL ACCESS SYSTEM

## (undated) DEVICE — DERMABOND SKIN ADH 0.7ML -- DERMABOND ADVANCED 12/BX

## (undated) DEVICE — SET CHOLANGIOGRAPHY 4FR L60CM W/ ARW KARLAN BLLN CATH CRV

## (undated) DEVICE — DRAPE,REIN 53X77,STERILE: Brand: MEDLINE

## (undated) DEVICE — SURGICAL PROCEDURE KIT GEN LAPAROSCOPY LF